# Patient Record
Sex: MALE | Race: WHITE | NOT HISPANIC OR LATINO | Employment: FULL TIME | URBAN - METROPOLITAN AREA
[De-identification: names, ages, dates, MRNs, and addresses within clinical notes are randomized per-mention and may not be internally consistent; named-entity substitution may affect disease eponyms.]

---

## 2019-06-05 ENCOUNTER — HOSPITAL ENCOUNTER (OUTPATIENT)
Dept: FAMILY MEDICINE CLINIC | Facility: CLINIC | Age: 34
Setting detail: SPECIMEN
Discharge: HOME | End: 2019-06-05
Attending: FAMILY MEDICINE | Admitting: FAMILY MEDICINE

## 2019-06-05 ENCOUNTER — CONVERSION ENCOUNTER (OUTPATIENT)
Dept: FAMILY MEDICINE CLINIC | Facility: CLINIC | Age: 34
End: 2019-06-05

## 2019-06-05 VITALS
OXYGEN SATURATION: 99 % | SYSTOLIC BLOOD PRESSURE: 133 MMHG | DIASTOLIC BLOOD PRESSURE: 88 MMHG | WEIGHT: 247 LBS | HEART RATE: 58 BPM | BODY MASS INDEX: 34.94 KG/M2

## 2019-06-05 LAB
ALBUMIN SERPL-MCNC: 4 G/DL (ref 3.5–4.8)
ALBUMIN/GLOB SERPL: 1.3 {RATIO} (ref 1–1.7)
ALP SERPL-CCNC: 62 IU/L (ref 32–91)
ALT SERPL-CCNC: 32 IU/L (ref 17–63)
ANION GAP SERPL CALC-SCNC: 12.7 MMOL/L (ref 10–20)
AST SERPL-CCNC: 24 IU/L (ref 15–41)
BILIRUB SERPL-MCNC: 0.6 MG/DL (ref 0.3–1.2)
BUN SERPL-MCNC: 11 MG/DL (ref 8–20)
BUN/CREAT SERPL: 11 (ref 6.2–20.3)
CALCIUM SERPL-MCNC: 8.8 MG/DL (ref 8.9–10.3)
CHLORIDE SERPL-SCNC: 105 MMOL/L (ref 101–111)
CHOLEST SERPL-MCNC: 228 MG/DL
CHOLEST/HDLC SERPL: 6.3 {RATIO}
CONV CO2: 24 MMOL/L (ref 22–32)
CONV LDL CHOLESTEROL DIRECT: 191 MG/DL (ref 0–100)
CONV TOTAL PROTEIN: 7.1 G/DL (ref 6.1–7.9)
CREAT UR-MCNC: 1 MG/DL (ref 0.7–1.2)
GLOBULIN UR ELPH-MCNC: 3.1 G/DL (ref 2.5–3.8)
GLUCOSE SERPL-MCNC: 98 MG/DL (ref 65–99)
HDLC SERPL-MCNC: 36 MG/DL
LDLC/HDLC SERPL: 5.3 {RATIO}
LIPID INTERPRETATION: ABNORMAL
POTASSIUM SERPL-SCNC: 3.7 MMOL/L (ref 3.6–5.1)
SODIUM SERPL-SCNC: 138 MMOL/L (ref 136–144)
TRIGL SERPL-MCNC: 104 MG/DL
VLDLC SERPL CALC-MCNC: 1.1 MG/DL

## 2019-06-06 NOTE — PROGRESS NOTES
Visit Type:  Annual Physical  Referring Provider:  Jamia Aiken MD  Primary Provider:  Jamia Aiken MD    CC:  CPE and fasting labs.    History of Present Illness:         The patient comes in today for a wellness visit.  Increaed anxiety related to new promotion at work.  The patient denies chest pain, palpitations, dizziness, syncope, low blood sugar symptoms, high blood sugar symptoms, edema, SOB, HUBER, PND and   orthopnea.  Since the last visit patient notes no new problems or concerns.  The patient admits to taking medications as prescribed and exercising occasionally.  When questioned about medication side effects, patient notes GI upset.        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 [place]                  Risk Factors:     Smoked Tobacco Use:  Former smoker     Cigarettes:  Yes -- 1 pack(s) per day,      Years smoked:  15        Year quit:  2016        Years Since Last Quit:  3   Caffeine use:  5+ drinks per day  Alcohol use:  no  Exercise:  no  Seatbelt use:  0 %  Sun Exposure:  occasionally    Previous Tobacco Use: Signed On - 2019  Smoked Tobacco Use:  Former smoker     Cigarettes:  Yes -- 1 pack(s) per day,      Years smoked:  15        Year quit:  2016        Years Since Last Quit:  3 years, 5 months, 4 days     Counseled to quit/cut down:  yes  Caffeine use:  5+ drinks per day    Previous Alcohol Use: Signed On -  2019  Alcohol use:  no  Exercise:  no  Seatbelt use:  0 %  Sun Exposure:  occasionally        Vital Signs:    Patient Profile:    34 Years Old Male  Height:     70.5 inches  Weight:     247 pounds  BMI:        34.94     O2 Sat:     99 %  Temp:       97.1 degrees F oral  Pulse rate: 58 / minute  BP Sittin / 88  (left arm)    Cuff size:  regular      Problems: Active problems were reviewed with the patient during this visit.  Medications: Medications were reviewed with the patient during this visit.  Allergies: Allergies were reviewed with the patient during this visit.        Vitals Entered By: Ankita Villareal CMA (2019 10:11 AM)      Physical Exam    General:      well developed, well nourished, in no acute distress.    Head:      normocephalic and atraumatic.    Eyes:      PERRL/EOM intact, conjunctiva and sclera clear with out nystagmus.    Ears:      TM's intact and clear with normal canals with grossly normal hearing.    Mouth:      no deformity or lesions with good dentition.    Neck:      no masses, thyromegaly, or abnormal cervical nodes.    Lungs:      clear bilaterally to auscultation.    Heart:      non-displaced PMI, chest non-tender; regular rate and rhythm, S1, S2 without murmurs, rubs, or gallops  Abdomen:       normal bowel sounds; no hepatosplenomegaly no ventral,umbilical hernias or masses noted.    Msk:      no deformity or scoliosis noted of thoracic or lumbar spine.    Extremities:      no pedal edema.    Neurologic:      CN II-XII gross intact.    Skin:      intact without lesions or rashes.    Cervical Nodes:      no significant adenopathy.    Psych:      alert and cooperative; normal mood and affect; normal attention span and concentration.        Blood Pressure:  Today's BP: 133/88 mm Hg    Labwork:   Most Recent Lab Results:   LDL: 119 mg/dL 2013    Active Medications (reviewed today):  ALLOPURINOL 300 MG ORAL TABLET (ALLOPURINOL) Take 1 tablet by mouth daily    Current  Allergies (reviewed today):  CODEINE (Critical)        Impression & Recommendations:    Problem # 1:  PREVENTIVE HEALTH CARE (ICD-V70.0) (ENC01-X73.00)    Reviewed preventive care protocols, scheduled due services, and updated immunizations.    Orders:  [facility] LIPID PANEL (LIPID)  [facility] COMPREHENSIVE METABOLIC PANEL (CMP) (MPC)  Est. Well Exam 18-39 yrs. (40027)          Patient Instructions:  1)  Discussed importance of regular exercise and recommended starting or continuing a regular exercise program for good health.  2)  The patient was encouraged to lose weight for better health.                      Medication Administration    Orders Added:  1)  [facility] LIPID PANEL [LIPID]  2)  [facility] COMPREHENSIVE METABOLIC PANEL (CMP) [MPC]  3)  Est. Well Exam 18-39 yrs. [90273]  ]      Electronically signed by Jamia Aiken MD on 06/05/2019 at 10:28 AM  ________________________________________________________________________       Disclaimer: Converted Note message may not contain all data elements that existed in the legacy source system. Please see [employer] for the original note details.

## 2019-06-17 ENCOUNTER — OFFICE VISIT (OUTPATIENT)
Dept: FAMILY MEDICINE CLINIC | Facility: CLINIC | Age: 34
End: 2019-06-17

## 2019-06-17 VITALS
HEART RATE: 74 BPM | TEMPERATURE: 98 F | OXYGEN SATURATION: 97 % | WEIGHT: 255 LBS | DIASTOLIC BLOOD PRESSURE: 74 MMHG | SYSTOLIC BLOOD PRESSURE: 124 MMHG | BODY MASS INDEX: 36.07 KG/M2

## 2019-06-17 DIAGNOSIS — M1A.09X0 CHRONIC GOUT OF MULTIPLE SITES, UNSPECIFIED CAUSE: Chronic | ICD-10-CM

## 2019-06-17 DIAGNOSIS — M67.432 GANGLION CYST OF WRIST, LEFT: Primary | ICD-10-CM

## 2019-06-17 PROBLEM — M10.9 GOUT: Chronic | Status: ACTIVE | Noted: 2019-06-17

## 2019-06-17 PROCEDURE — 99213 OFFICE O/P EST LOW 20 MIN: CPT | Performed by: FAMILY MEDICINE

## 2019-06-17 RX ORDER — HYDROCODONE BITARTRATE AND ACETAMINOPHEN 5; 325 MG/1; MG/1
1 TABLET ORAL EVERY 6 HOURS PRN
Qty: 28 TABLET | Refills: 0
Start: 2019-06-17 | End: 2019-07-31

## 2019-06-17 RX ORDER — ALLOPURINOL 300 MG/1
300 TABLET ORAL DAILY
Refills: 3 | COMMUNITY
Start: 2019-05-08 | End: 2020-05-11

## 2019-06-17 NOTE — PROGRESS NOTES
Subjective   Chief Complaint   Patient presents with   • Hand Pain     Lt hand feels a knot     Paul Vaughn is a 34 y.o. male.     Hand Pain    The incident occurred more than 1 week ago. Incident location: No prior incident.  No known injury.  There was no injury mechanism. The pain is present in the left hand. The quality of the pain is described as aching. The pain is at a severity of 9/10. The pain is severe. The pain has been intermittent since the incident. Pertinent negatives include no chest pain. The symptoms are aggravated by movement. He has tried NSAIDs and ice for the symptoms. The treatment provided mild relief.      Past Medical History:   Diagnosis Date   • Depression    • Gout    • Insomnia    • Pneumonia 2016     Past Surgical History:   Procedure Laterality Date   • VASECTOMY     • WISDOM TOOTH EXTRACTION       Allergies   Allergen Reactions   • Codeine Unknown (See Comments)     Pt told as a child     Social History     Socioeconomic History   • Marital status:      Spouse name: Not on file   • Number of children: Not on file   • Years of education: Not on file   • Highest education level: Not on file   Tobacco Use   • Smoking status: Former Smoker     Social History     Tobacco Use   Smoking Status Former Smoker       family history includes Alcohol abuse in his other; Allergies in his other; Arthritis in his other; Gout in his other; Mental illness in his mother.  Current Outpatient Medications on File Prior to Visit   Medication Sig Dispense Refill   • allopurinol (ZYLOPRIM) 300 MG tablet Take 300 mg by mouth Daily.  3     No current facility-administered medications on file prior to visit.      Patient Active Problem List   Diagnosis   • Gout       The following portions of the patient's history were reviewed and updated as appropriate: allergies, current medications, past family history, past medical history, past social history, past surgical history and problem  list.    Review of Systems   Constitutional: Negative for chills and fever.   HENT: Negative for sinus pressure and sore throat.    Eyes: Negative for blurred vision.   Respiratory: Negative for cough and shortness of breath.    Cardiovascular: Negative for chest pain and palpitations.   Gastrointestinal: Negative for abdominal pain.   Endocrine: Negative for polyuria.   Musculoskeletal: Positive for arthralgias and myalgias.   Skin: Negative for color change, dry skin and rash.   Neurological: Negative for dizziness and headache.   Hematological: Negative for adenopathy.   Psychiatric/Behavioral: Negative for depressed mood.       Objective   /74 (BP Location: Left arm, Patient Position: Sitting, Cuff Size: Adult)   Pulse 74   Temp 98 °F (36.7 °C) (Oral)   Wt 116 kg (255 lb)   SpO2 97%   BMI 36.07 kg/m²   Physical Exam   Constitutional: He appears well-nourished.   Cardiovascular: Normal rate, regular rhythm and normal heart sounds.   Pulmonary/Chest: Effort normal and breath sounds normal.   Musculoskeletal:        Left wrist: He exhibits decreased range of motion and tenderness. He exhibits no effusion, no crepitus, no deformity and no laceration.   Neurological: He is alert.   Skin: Skin is warm and dry. Capillary refill takes less than 2 seconds.         Assessment/Plan   Problems Addressed this Visit        Other    Gout (Chronic)     Continue current medicines.            Other Visit Diagnoses     Ganglion cyst of wrist, left    -  Primary    Rest, ice, elevate left wrist/hand.  Refer to hand surgeon for further assessment.  Ganglion cyst is likely but need to rule out gouty arthropathy.    Relevant Medications    HYDROcodone-acetaminophen (NORCO) 5-325 MG per tablet    Other Relevant Orders    Ambulatory Referral to Hand Surgery

## 2019-07-31 ENCOUNTER — OFFICE VISIT (OUTPATIENT)
Dept: FAMILY MEDICINE CLINIC | Facility: CLINIC | Age: 34
End: 2019-07-31

## 2019-07-31 VITALS
DIASTOLIC BLOOD PRESSURE: 89 MMHG | OXYGEN SATURATION: 99 % | HEART RATE: 69 BPM | TEMPERATURE: 97.2 F | SYSTOLIC BLOOD PRESSURE: 142 MMHG

## 2019-07-31 DIAGNOSIS — M79.672 LEFT FOOT PAIN: Primary | ICD-10-CM

## 2019-07-31 DIAGNOSIS — M1A.09X0 CHRONIC GOUT OF MULTIPLE SITES, UNSPECIFIED CAUSE: Chronic | ICD-10-CM

## 2019-07-31 PROCEDURE — 99213 OFFICE O/P EST LOW 20 MIN: CPT | Performed by: FAMILY MEDICINE

## 2019-07-31 RX ORDER — NAPROXEN 500 MG/1
500 TABLET ORAL 2 TIMES DAILY WITH MEALS
Qty: 60 TABLET | Refills: 2 | Status: SHIPPED | OUTPATIENT
Start: 2019-07-31 | End: 2020-05-20

## 2019-07-31 RX ORDER — HYDROCODONE BITARTRATE AND ACETAMINOPHEN 5; 325 MG/1; MG/1
1 TABLET ORAL EVERY 8 HOURS PRN
Qty: 20 TABLET | Refills: 0 | Status: SHIPPED | OUTPATIENT
Start: 2019-07-31 | End: 2019-08-20

## 2019-07-31 RX ORDER — NAPROXEN 375 MG/1
375 TABLET ORAL
Refills: 0 | COMMUNITY
Start: 2019-07-04 | End: 2019-07-31

## 2019-07-31 NOTE — PROGRESS NOTES
Subjective   Chief Complaint   Patient presents with   • Foot Pain     it popped 7/27 morning and has been hurting ever since. It starting swollen yesterday     Paul Vaughn is a 34 y.o. male.     He has been working a lot more hours for work. He stepped out of bed on the floor and just started having pain.  H/O gout but this feels different.  H/O plantar fasciitis on the right side but this feels different. No known injury. He is walking with the help of a walker and is not bearing weight on his left foot.       Foot Pain   This is a new problem. The current episode started in the past 7 days. The problem occurs constantly. The problem has been gradually worsening. Associated symptoms include arthralgias and joint swelling. Pertinent negatives include no chills, coughing, fatigue, fever, headaches or rash. The symptoms are aggravated by standing and walking. He has tried NSAIDs and oral narcotics for the symptoms. The treatment provided moderate relief.      Past Medical History:   Diagnosis Date   • Depression    • Gout    • Insomnia    • Pneumonia 2016     Past Surgical History:   Procedure Laterality Date   • VASECTOMY     • WISDOM TOOTH EXTRACTION       Allergies   Allergen Reactions   • Codeine Unknown (See Comments)     Pt told as a child     Social History     Socioeconomic History   • Marital status:      Spouse name: Not on file   • Number of children: Not on file   • Years of education: Not on file   • Highest education level: Not on file   Tobacco Use   • Smoking status: Former Smoker     Social History     Tobacco Use   Smoking Status Former Smoker       family history includes Alcohol abuse in his other; Allergies in his other; Arthritis in his other; Gout in his other; Mental illness in his mother.  Current Outpatient Medications on File Prior to Visit   Medication Sig Dispense Refill   • [DISCONTINUED] naproxen (NAPROSYN) 375 MG tablet Take 375 mg by mouth 2 (Two) Times a Day Before  Meals.  0   • allopurinol (ZYLOPRIM) 300 MG tablet Take 300 mg by mouth Daily.  3   • [DISCONTINUED] HYDROcodone-acetaminophen (NORCO) 5-325 MG per tablet Take 1 tablet by mouth Every 6 (Six) Hours As Needed for Moderate Pain . 28 tablet 0     No current facility-administered medications on file prior to visit.      Patient Active Problem List   Diagnosis   • Gout   • Left foot pain   • Hypertriglyceridemia       The following portions of the patient's history were reviewed and updated as appropriate: allergies, current medications, past family history, past medical history, past social history, past surgical history and problem list.    Review of Systems   Constitutional: Negative for chills, fatigue and fever.   Respiratory: Negative for cough.    Musculoskeletal: Positive for arthralgias and joint swelling.   Skin: Negative for rash.       Objective   /89 (BP Location: Left arm, Patient Position: Sitting, Cuff Size: Adult)   Pulse 69   Temp 97.2 °F (36.2 °C) (Oral)   SpO2 99%   Physical Exam   Constitutional: He appears well-developed and well-nourished.   Cardiovascular: Normal rate and regular rhythm.   Pulmonary/Chest: Effort normal and breath sounds normal.   Musculoskeletal:   Left dorsal foot with swelling and pain on palpation but no bruising or redness.     Neurological: He is alert.   Skin: Skin is warm.       No visits with results within 1 Week(s) from this visit.   Latest known visit with results is:   Hospital Outpatient Visit on 06/05/2019   Component Date Value Ref Range Status   • Total Cholesterol 06/05/2019 228* <200 mg/dL Final   • Triglycerides 06/05/2019 104  <150 mg/dL Final   • HDL Cholesterol 06/05/2019 36* >39 mg/dL Final   • LDL Cholesterol  06/05/2019 191* 0 - 100 mg/dL Final   • VLDL Cholesterol 06/05/2019 1.1  <21 mg/dL Final   • LDL/HDL Ratio 06/05/2019 5.3   Final   • Chol/HDL Ratio 06/05/2019 6.3   Final   • Lipid Interpretation 06/05/2019 (SEE BELOW)   Final    Comment:    The following guidelines have been recommended by the NCEP for Total  Cholesterol, Total Triglycerides, LDL Cholesterol,and HDL Cholesterol:    TOTAL CHOLESTEROL                    HDL CHOLESTEROL   Desirable:            <200 mg/dL     Low HDL:            <40 mg/dL   Borderline High:   200-239 mg/dL     Normal:           40-60 mg/dL   High:           > or = 240 mg/dL     Desirable:          >60 mg/dL    TOTAL TRIGLYCERIDE                   LDL CHOLESTEROL   Normal:               <150 mg/dL     Optimal:           <100 mg/dL   Borderline High:   150-199 mg/dL     Low Risk:       100-129 mg/dL   High:              200-499 mg/dL     Borderline High:130-159 mg/dL   Very High:      > or = 500 mg/dL     High:           160-189 mg/dL                                        Very High:   > or = 190 mg/dL    The following ratios of LDL to HDL and Total Cholesterol to HDL are  for information only:    LDL/HDL RATIO                        TOTAL CHOLESTEROL/HDL RATIO   Desirable:              <5                                      Low Risk:       3.3-4.4    Optimal:        < or = 3.5           Average Risk:   4.4-7.1                                         Medium Risk:    7.1-11                                          High Risk:         >11         • Sodium 06/05/2019 138  136 - 144 mmol/L Final   • Potassium 06/05/2019 3.7  3.6 - 5.1 mmol/L Final   • Chloride 06/05/2019 105  101 - 111 mmol/L Final   • CO2 06/05/2019 24  22 - 32 mmol/L Final   • Glucose 06/05/2019 98  65 - 99 mg/dL Final   • BUN 06/05/2019 11  8 - 20 mg/dL Final   • Creatinine 06/05/2019 1.0  0.7 - 1.2 mg/dl Final   • Calcium 06/05/2019 8.8* 8.9 - 10.3 mg/dL Final   • Total Protein 06/05/2019 7.1  6.1 - 7.9 g/dL Final   • Albumin 06/05/2019 4.0  3.5 - 4.8 g/dL Final   • Total Bilirubin 06/05/2019 0.6  0.3 - 1.2 mg/dL Final   • Alkaline Phosphatase 06/05/2019 62  32 - 91 IU/L Final   • AST (SGOT) 06/05/2019 24  15 - 41 IU/L Final   • ALT (SGPT) 06/05/2019 32   17 - 63 IU/L Final   • Anion Gap 06/05/2019 12.7  10 - 20 Final   • BUN/Creatinine Ratio 06/05/2019 11.0  6.2 - 20.3 Final   • GFR MDRD Non  06/05/2019 >60  >60 mL/min/1.73m2 Final   • GFR MDRD  06/05/2019 >60  >60 mL/min/1.73m2 Final   • Globulin 06/05/2019 3.1  2.5 - 3.8 G/dL Final   • A/G Ratio 06/05/2019 1.3  1.0 - 1.7 Final           Assessment/Plan   Problems Addressed this Visit        Nervous and Auditory    Left foot pain - Primary     Rest, ice, elevate foot.  Refer to podiatry.         Relevant Orders    Ambulatory Referral to Podiatry       Other    Gout (Chronic)    Relevant Orders    Ambulatory Referral to Podiatry          Paul was seen today for foot pain.    Diagnoses and all orders for this visit:    Left foot pain  -     Ambulatory Referral to Podiatry    Chronic gout of multiple sites, unspecified cause  -     Ambulatory Referral to Podiatry    Other orders  -     naproxen (NAPROSYN) 500 MG tablet; Take 1 tablet by mouth 2 (Two) Times a Day With Meals.  -     HYDROcodone-acetaminophen (NORCO) 5-325 MG per tablet; Take 1 tablet by mouth Every 8 (Eight) Hours As Needed for Moderate Pain .

## 2019-07-31 NOTE — PATIENT INSTRUCTIONS
Cryotherapy  What is cryotherapy?  Cryotherapy, or cold therapy, is a treatment that uses cold temperatures to treat an injury or medical condition. It includes using cold packs or ice packs to reduce pain and swelling. Only use cryotherapy if your doctor says it is okay.  How do I use cryotherapy?    · Place a towel between the cold source and your skin.  · Apply the cold source for no more than 20 minutes at a time.  · Check your skin after 5 minutes to make sure there are no signs of a poor response to cold or skin damage. Check for:  · White spots on your skin. Your skin may look blotchy or mottled.  · Skin that looks blue or pale.  · Skin that feels waxy or hard.  · Repeat these steps as many times each day as told by your doctor.  How can I make a cold pack?  When using a cold pack at home to reduce pain and swelling, you can use:  · A silica gel cold pack that has been left in the freezer. You can buy this online or in stores.  · A plastic bag of frozen vegetables.  · A sealable plastic bag that has been filled with crushed ice.  Always wrap the pack in a dry or damp towel to avoid direct contact with your skin.  Contact a doctor if:  · You start to have white spots on your skin. This may give your skin a blotchy or mottled look.  ? Your skin turns blue or pale.  ? Your skin becomes waxy or hard.  ? Your swelling gets worse.  This information is not intended to replace advice given to you by your health care provider. Make sure you discuss any questions you have with your health care provider.  Document Released: 06/05/2009 Document Revised: 02/22/2018 Document Reviewed: 08/31/2016  [employer] Interactive Patient Education © 2019 [employer]

## 2019-08-20 ENCOUNTER — OFFICE VISIT (OUTPATIENT)
Dept: PODIATRY | Facility: CLINIC | Age: 34
End: 2019-08-20

## 2019-08-20 VITALS
HEIGHT: 71 IN | SYSTOLIC BLOOD PRESSURE: 126 MMHG | HEART RATE: 60 BPM | WEIGHT: 246 LBS | BODY MASS INDEX: 34.44 KG/M2 | DIASTOLIC BLOOD PRESSURE: 85 MMHG

## 2019-08-20 DIAGNOSIS — M72.2 PLANTAR FASCIITIS, BILATERAL: Primary | ICD-10-CM

## 2019-08-20 PROCEDURE — 99203 OFFICE O/P NEW LOW 30 MIN: CPT | Performed by: PODIATRIST

## 2019-08-20 RX ORDER — AMOXICILLIN 500 MG/1
CAPSULE ORAL
COMMUNITY
Start: 2019-08-19 | End: 2020-05-20

## 2019-08-20 NOTE — PROGRESS NOTES
08/20/2019  Foot and Ankle Surgery - New Patient   Provider: Dr. Parker Anderson DPM  Location: [facility]    Subjective:  Paul Vaughn is a 34 y.o. male.     Chief Complaint   Patient presents with   • Left Foot - Pain   • Right Foot - Pain       HPI: Patient is a 34-year-old male that presents with bilateral foot pain.  Symptoms have been present for several months.  He feels that symptoms are gradually progressive with increased activity and improved with rest.  He predominantly notices discomfort to the forefoot and heel region.  Symptoms are present with first few steps in the morning.  He rates the pain a 7 out of 10 when noticed.  He denies any injury to the feet.    Allergies   Allergen Reactions   • Codeine Unknown (See Comments)     Pt told as a child       Past Medical History:   Diagnosis Date   • Depression    • Gout    • Insomnia    • Pneumonia 2016       Past Surgical History:   Procedure Laterality Date   • VASECTOMY     • WISDOM TOOTH EXTRACTION         Family History   Problem Relation Age of Onset   • Mental illness Mother         Psychiatric care.   • Allergies Other    • Gout Other    • Alcohol abuse Other    • Arthritis Other        Social History     Socioeconomic History   • Marital status:      Spouse name: Not on file   • Number of children: Not on file   • Years of education: Not on file   • Highest education level: Not on file   Tobacco Use   • Smoking status: Former Smoker     Last attempt to quit: 2016     Years since quitting: 3.6   • Smokeless tobacco: Never Used   Substance and Sexual Activity   • Alcohol use: No     Frequency: Never   • Drug use: Defer   • Sexual activity: Defer        Current Outpatient Medications on File Prior to Visit   Medication Sig Dispense Refill   • allopurinol (ZYLOPRIM) 300 MG tablet Take 300 mg by mouth Daily.  3   • amoxicillin (AMOXIL) 500 MG capsule      • naproxen (NAPROSYN) 500 MG tablet Take 1 tablet by mouth 2 (Two) Times a  "Day With Meals. 60 tablet 2   • [DISCONTINUED] HYDROcodone-acetaminophen (NORCO) 5-325 MG per tablet Take 1 tablet by mouth Every 8 (Eight) Hours As Needed for Moderate Pain . 20 tablet 0     No current facility-administered medications on file prior to visit.        Review of Systems:  General: Denies fever, chills, fatigue, and weakness.  Eyes: Denies vision loss, blurry vision, and excessive redness.  ENT: Denies hearing issues and difficulty swallowing.  Cardiovascular: Denies palpitations, chest pain, or syncopal episodes.  Respiratory: Denies shortness of breath, wheezing, and coughing.  GI: Denies abdominal pain, nausea, and vomiting.   : Denies frequency, hematuria, and urgency.  Musculoskeletal: + Bilateral foot pain  Derm: Denies rash, open wounds, or suspicious lesions.  Neuro: Denies headaches, numbness, loss of coordination, and tremors.  Psych: Denies anxiety and depression.  Endocrine: Denies temperature intolerance and changes in appetite.  Heme: Denies bleeding disorders or abnormal bruising.     Objective   /85   Pulse 60   Ht 179.1 cm (70.5\")   Wt 112 kg (246 lb)   BMI 34.80 kg/m²     General Exam  Appearance: appears stated age and healthy   Orientation: alert and oriented to person, place, and time   Affect: appropriate   Gait: antalgic     Foot/Ankle Exam  Inspection and Palpation  Ecchymosis - Right: none Left: none  Tenderness - Right: calcaneus tenderness   Left: calcaneus tenderness   Swelling - Right: none   Left: none    Arch - Right: normal Left: normal  Hammertoes - Right: absent Left: absent  Claw Toes - Right: absent Left: absent  Mallet Toes - Right: absent Left: absent  Hallux Valgus - Right: no Left: no  Hallux Limitus - Right: no Left: no  Skin - Right: skin intact  Left: skin intact     Vascular  Dorsalis Pedis - Right: 3+ Left: 3+  Posterior Tibial - Right: 3+ Left: 3+  Skin Temperature -  Right: warm  Left: warm    CFT - Right: < 3 seconds Left: < 3 " seconds    Neurologic  Saphenous Nerve Sensation  - Right: normal Left: normal  Tibial Nerve Sensation - Right: normal Left: normal  Superficial Peroneal Nerve Sensation - Right: normal Left: normal  Deep Peroneal Nerve Sensation - Right: normal Left: normal  Sural Nerve Sensation - Right: normal Left: normal  Achilles Reflex - Right: 2+ Left: 2+    Muscle Strength  Dorsiflexion - Right: 5 Left: 5  Plantar Flexion - Right: 5 Left: 5  Eversion - Right: 5 Left: 5  Inversion - Right: 5 Left: 5  Great Toe Extension - Right: 5 Left: 5  Great Toe Flexion - Right: 5 Left: 5    Range of Motion  Normal right ankle ROM  Normal left ankle ROM    Tests  Calcaneal Squeeze - Right: positive  Left: positive     Right Foot/Ankle Comments  Equinus contracture with knee extended and flexed, bilateral. no gross deformity or instability, bilateral          Assessment/Plan   Paul was seen today for pain and pain.    Diagnoses and all orders for this visit:    Plantar fasciitis, bilateral  -     XR Foot 3+ View Bilateral  -     Ambulatory Referral to Physical Therapy Evaluate and treat; Stretching, ROM, Strengthening      Patient presents with long-standing issues involving the plantar aspect of both feet.  He localizes discomfort to the arch and heel regions today.  He complains of pain with weightbearing activity that improves with rest.  Imaging was reviewed showing no fractures or dislocations.  He does have spur formation involving the posterior aspect of the calcaneus, bilaterally.  On exam, he does have pain to the plantar heel calcaneal tuberosity.  He also has significant soft tissue rigidity and equinus contracture.  I do feel that his symptoms are secondary to tissue tightness and plantar fasciitis.  We reviewed the diagnoses and treatment options at length.  I have asked that he increase his support with over-the-counter devices.  We also reviewed proper shoes.  I have spends stretching exercises that he is to perform on  a daily basis.  We did review rice therapy, proper activity, and anti-inflammatory use.  I would like to see him in 4 to 6 weeks for reevaluation.  If he continues to have discomfort, may need to consider proceeding with steroid injections.    Orders Placed This Encounter   Procedures   • XR Foot 3+ View Bilateral     Weight Bearing     Order Specific Question:   Reason for Exam:     Answer:   Bilateral foot pain with swelling no pin point of pain RM 14 WB NPS   • Ambulatory Referral to Physical Therapy Evaluate and treat; Stretching, ROM, Strengthening     Referral Priority:   Routine     Referral Type:   Therapy     Referral Reason:   Specialty Services Required     Requested Specialty:   Physical Therapy     Number of Visits Requested:   1        Note is dictated utilizing voice recognition software. Unfortunately this leads to occasional typographical errors. I apologize in advance if the situation occurs. If questions occur please do not hesitate to call our office.

## 2019-08-27 ENCOUNTER — OFFICE VISIT (OUTPATIENT)
Dept: PHYSICAL THERAPY | Facility: CLINIC | Age: 34
End: 2019-08-27

## 2019-08-27 DIAGNOSIS — R26.2 DIFFICULTY IN WALKING: ICD-10-CM

## 2019-08-27 DIAGNOSIS — M72.2 BILATERAL PLANTAR FASCIITIS: Primary | ICD-10-CM

## 2019-08-27 PROCEDURE — 97110 THERAPEUTIC EXERCISES: CPT | Performed by: PHYSICAL THERAPIST

## 2019-08-27 PROCEDURE — 97161 PT EVAL LOW COMPLEX 20 MIN: CPT | Performed by: PHYSICAL THERAPIST

## 2019-08-27 NOTE — PROGRESS NOTES
Physical Therapy Initial Evaluation and Plan of Care    Patient: Paul Vaughn   : 1985  Diagnosis/ICD-10 Code:  Bilateral plantar fasciitis [M72.2]  Referring practitioner: RASHARD Anderson DPM  Date of Initial Visit: 2019  Today's Date: 2019  Patient seen for 1 sessions           Subjective Questionnaire: LEFS: 65      Subjective Evaluation    History of Present Illness  Mechanism of injury: Patient reports that in 2019 he started having pain in the left foot when he was diagnosed with plantar fasciitis. He started having right foot pain in 2019. He has noticed that his shoes can have an affect on his feet. He has a history of gout, however, he can feel the difference between gout and foot pain. It has gotten bad enough to where he has had to use a walker. Walking and standing for long periods of time, working out, sleeping throughout the night, and wearing bad shoes tends to aggravate symptoms.    Quality of life: good    Pain  Current pain ratin  At best pain ratin  At worst pain ratin  Quality: burning and throbbing  Relieving factors: ice, rest and medications  Aggravating factors: ambulation, stairs, standing and sleeping  Progression: no change    Social Support  Lives in: one-story house  Lives with: spouse and young children    Patient Goals  Patient goals for therapy: decreased pain, improved balance, increased motion, increased strength, independence with ADLs/IADLs and return to sport/leisure activities  Patient goal: To have less pain in the feet           Objective       Palpation   Left   Hypertonic in the lateral gastrocnemius, medial gastrocnemius and plantaris.   Tenderness of the lateral gastrocnemius, medial gastrocnemius and plantaris.     Right   Hypertonic in the lateral gastrocnemius, medial gastrocnemius and plantaris. Tenderness of the lateral gastrocnemius, medial gastrocnemius and plantaris.     Tenderness   Left Ankle/Foot    Tenderness in the Achilles insertion.     Right Ankle/Foot   Tenderness in the Achilles insertion.     Active Range of Motion   Left Ankle/Foot   Dorsiflexion (ke): 0 degrees   Plantar flexion: WFL    Right Ankle/Foot   Dorsiflexion (ke): 0 degrees   Plantar flexion: WFL    Passive Range of Motion   Left Ankle/Foot    Dorsiflexion (ke): 5 degrees     Right Ankle/Foot    Dorsiflexion (ke): 5 degrees     Joint Play   Left Ankle/Foot  Hypomobile in the talocrural joint and subtalar joint.     Right Ankle/Foot  Hypomobile in the talocrural joint and subtalar joint.     Strength/Myotome Testing     Left Hip   Planes of Motion   Flexion: 4+    Right Hip   Planes of Motion   Flexion: 4+    Left Knee   Flexion: 4+  Extension: 4+    Right Knee   Flexion: 4+  Extension: 4+    Left Ankle/Foot   Dorsiflexion: 5  Inversion: 5  Eversion: 5  Great toe extension: 5    Right Ankle/Foot   Dorsiflexion: 5  Inversion: 5  Eversion: 5  Great toe extension: 5    Tests   Left Ankle/Foot   Positive for windlass.     Right Ankle/Foot   Positive for windlass.          Assessment & Plan     Assessment  Impairments: abnormal coordination, abnormal gait, abnormal muscle firing, abnormal muscle tone, abnormal or restricted ROM, activity intolerance, impaired physical strength, lacks appropriate home exercise program and pain with function  Assessment details: The patient is a 34 y.o. male who presents to physical therapy today for bilateral plantar pain. Upon initial evaluation, the patient demonstrates the following impairments: increased pain, increased muscle tone, an antalgic gait pattern, decreased strength, decreased joint mobility, and decreased ROM. Due to these impairments, the patient is unable to stand or walk for long periods of time, sleep throughout the night, and go barefoot without an increase in symptoms. The patient would benefit from skilled PT services to address functional limitations and impairments and to improve patient  quality of life.      Prognosis: good  Functional Limitations: walking, uncomfortable because of pain and standing  Goals  Plan Goals: STG's: 2 weeks   1. Patient will report pain level at worse </= 5/10 for improved tolerance to ADLs and functional mobility  2. Patient will require <3 tactile or verbal cues for proper mechanics during completion of his HEP     LTG's: By Discharge  1. Patient will report pain levels at worst </= 3/10 for improved tolerance to ADLs and functional mobility  2. Patient will be able to ambulate unlimited distances without an assistive device and no increased pain  3. Patient will report improved levels of overall function as demonstrated by an increase in his reported level of function score on the LEFS    4. Patient will report improvement in their tolerance to sleeping and report waking up </= 1x/night per positional discomfort  5. Patient will require no tactile or verbal cues for proper mechanics during completion of his HEP for final independence         Plan  Therapy options: will be seen for skilled physical therapy services  Planned modality interventions: cryotherapy, electrical stimulation/Russian stimulation, TENS, thermotherapy (hydrocollator packs) and ultrasound  Other planned modality interventions: dry needling  Planned therapy interventions: balance/weight-bearing training, fine motor coordination training, flexibility, functional ROM exercises, gait training, home exercise program, joint mobilization, manual therapy, neuromuscular re-education, soft tissue mobilization, spinal/joint mobilization, strengthening, stretching and therapeutic activities  Duration in visits: 12  Treatment plan discussed with: patient        History # of Personal Factors and/or Comorbidities: LOW (0)  Examination of Body System(s): # of elements: LOW (1-2)  Clinical Presentation: STABLE   Clinical Decision Making: LOW       Timed:         Manual Therapy:    8     mins  02989;     Therapeutic  Exercise:    10     mins  23298;     Neuromuscular Ludin:        mins  77297;    Therapeutic Activity:          mins  60351;     Gait Training:           mins  45492;     Ultrasound:          mins  99322;    Ionto                                   mins   28146  Self Care                            mins   97659  Canalith Repos         mins 41833      Un-Timed:  Electrical Stimulation:         mins  75230 ( );  Dry Needling          mins self-pay  Traction          mins 72194  Low Eval     25     Mins  40775  Mod Eval          Mins  76089  High Eval                            Mins  00452  Re-Eval                               mins  85252        Timed Treatment:   18   mins   Total Treatment:     50   mins    PT SIGNATURE: Danielle Powell PT   DATE TREATMENT INITIATED: 8/27/2019    Initial Certification  Certification Period: 11/25/2019  I certify that the therapy services are furnished while this patient is under my care.  The services outlined above are required by this patient, and will be reviewed every 90 days.     PHYSICIAN: RASHARD Anderson DPIAN      DATE:     Please sign and return via fax to [phone].. Thank you, [facility].

## 2019-09-03 ENCOUNTER — OFFICE VISIT (OUTPATIENT)
Dept: PHYSICAL THERAPY | Facility: CLINIC | Age: 34
End: 2019-09-03

## 2019-09-03 DIAGNOSIS — R26.2 DIFFICULTY IN WALKING: ICD-10-CM

## 2019-09-03 DIAGNOSIS — M72.2 BILATERAL PLANTAR FASCIITIS: Primary | ICD-10-CM

## 2019-09-03 PROCEDURE — 97110 THERAPEUTIC EXERCISES: CPT | Performed by: PHYSICAL THERAPIST

## 2019-09-03 PROCEDURE — 97112 NEUROMUSCULAR REEDUCATION: CPT | Performed by: PHYSICAL THERAPIST

## 2019-09-03 PROCEDURE — 97140 MANUAL THERAPY 1/> REGIONS: CPT | Performed by: PHYSICAL THERAPIST

## 2019-09-03 NOTE — PROGRESS NOTES
Physical Therapy Daily Progress Note    VISIT#: 2    Subjective   Paul Vaughn reports that he did a lot of walking over the weekend and his shoes aren't the greatest so he is feeling it today.    Pain Rating (0-10): 4    Objective     See Exercise, Manual, and Modality Logs for complete treatment.     Patient Education: More supportive shoes    Assessment & Plan     Assessment  Assessment details: Patient had moderate tightness throughout plantar fascia but tolerated treatment well.  He had mild pain with manual therapy.   He tolerated exercise progression well.           Progress per Plan of Care and Progress strengthening /stabilization /functional activity            Timed:         Manual Therapy:    15     mins  66687;     Therapeutic Exercise:    15     mins  52635;     Neuromuscular Ludin:    15    mins  01578;    Therapeutic Activity:          mins  01310;     Gait Training:           mins  26462;     Ultrasound:          mins  42952;    Ionto                                   mins   97403  Self Care                            mins   93242  Canalith Repos                   mins  74237    Un-Timed:  Electrical Stimulation:         mins  46578 ( );  Dry Needling          mins self-pay  Traction          mins 65879  Low Eval          Mins  70997  Mod Eval          Mins  77935  High Eval                            Mins  00186  Re-Eval                               mins  63768    Timed Treatment:   45   mins   Total Treatment:     45   mins    Sheeba Hamilton PT  [place] License # [id number]  Physical Therapist

## 2019-09-10 ENCOUNTER — OFFICE VISIT (OUTPATIENT)
Dept: PHYSICAL THERAPY | Facility: CLINIC | Age: 34
End: 2019-09-10

## 2019-09-10 DIAGNOSIS — M72.2 BILATERAL PLANTAR FASCIITIS: Primary | ICD-10-CM

## 2019-09-10 DIAGNOSIS — R26.2 DIFFICULTY IN WALKING: ICD-10-CM

## 2019-09-10 PROCEDURE — 97110 THERAPEUTIC EXERCISES: CPT | Performed by: PHYSICAL THERAPIST

## 2019-09-10 PROCEDURE — 97530 THERAPEUTIC ACTIVITIES: CPT | Performed by: PHYSICAL THERAPIST

## 2019-09-10 PROCEDURE — 97140 MANUAL THERAPY 1/> REGIONS: CPT | Performed by: PHYSICAL THERAPIST

## 2019-09-10 NOTE — PROGRESS NOTES
Physical Therapy Daily Progress Note    VISIT#: 3    Subjective   Paul Vaughn reports that he has had a little more soreness in the left foot today compared to the right foot.  Pain Ratin    Objective     See Exercise, Manual, and Modality Logs for complete treatment.     Patient Education: Pacers and Racers, proper shoe wear    Assessment & Plan     Assessment  Assessment details: The patient demonstrated tenderness and hypertonicity along the L plantar fascia which responded well to myofascial release. Patient tolerated added exercises well today with no increase in symptoms.        Progress per Plan of Care and Progress strengthening /stabilization /functional activity          Timed:         Manual Therapy:    18     mins  78335;     Therapeutic Exercise:    15     mins  70177;     Neuromuscular Ludin:        mins  77016;    Therapeutic Activity:     10     mins  21298;     Gait Training:           mins  47254;     Ultrasound:          mins  63854;    Ionto                                   mins   22781  Self Care                            mins   21863  Canalith Repos                   mins  42258    Un-Timed:  Electrical Stimulation:         mins  13429 ( );  Dry Needling          mins self-pay  Traction          mins 90326  Low Eval          Mins  18248  Mod Eval          Mins  28791  High Eval                            Mins  48015  Re-Eval                               mins  00854    Timed Treatment:   43   mins   Total Treatment:     46   mins    Danielle Powell PT  Physical Therapist  [place] License # [id number]

## 2019-09-17 ENCOUNTER — TREATMENT (OUTPATIENT)
Dept: PHYSICAL THERAPY | Facility: CLINIC | Age: 34
End: 2019-09-17

## 2019-09-17 DIAGNOSIS — M72.2 BILATERAL PLANTAR FASCIITIS: Primary | ICD-10-CM

## 2019-09-17 DIAGNOSIS — R26.2 DIFFICULTY IN WALKING: ICD-10-CM

## 2019-09-17 PROCEDURE — 97140 MANUAL THERAPY 1/> REGIONS: CPT | Performed by: PHYSICAL THERAPIST

## 2019-09-17 PROCEDURE — 97110 THERAPEUTIC EXERCISES: CPT | Performed by: PHYSICAL THERAPIST

## 2019-09-17 NOTE — PROGRESS NOTES
Physical Therapy Daily Progress Note      Patient: Paul Vaughn   : 1985  Diagnosis/ICD-10 Code:  Bilateral plantar fasciitis [M72.2]  Referring practitioner: RASHARD Anderson DPM  Date of Initial Visit: Type: THERAPY  Noted: 2019  Today's Date: 2019  Patient seen for 4 sessions             Subjective Pt says that he [place] and the main body part that was sore the next day was his shoulders, not his feet.    Objective   See Exercise, Manual, and Modality Logs for complete treatment.       Assessment/Plan  Pt had good tolerance with manual techniques and exercises today.  Add BAPS next visit.    Progress per Plan of Care           Timed:         Manual Therapy:    15     mins  86820;     Therapeutic Exercise:    10    mins  58209;     Neuromuscular Ludin:        mins  84694;    Therapeutic Activity:          mins  46047;     Gait Training:           mins  11891;     Ultrasound:          mins  20180;    Ionto                                   mins   18067  Self Care                            mins   27401      Un-Timed:  Electrical Stimulation:         mins  63617 ( );  Dry Needling          mins self-pay  Traction          mins 75391      Timed Treatment:   25   mins   Total Treatment:     60   mins    Nithin Garcia PTA  Physical Therapist

## 2019-09-23 ENCOUNTER — TELEPHONE (OUTPATIENT)
Dept: FAMILY MEDICINE CLINIC | Facility: CLINIC | Age: 34
End: 2019-09-23

## 2019-09-23 NOTE — TELEPHONE ENCOUNTER
Pt got a Friday the 13th tattoo on Friday the 13th and no thinks it is infected because it is red around the tattoo. Does he need a antibx or just some neosporin?

## 2019-09-24 ENCOUNTER — TREATMENT (OUTPATIENT)
Dept: PHYSICAL THERAPY | Facility: CLINIC | Age: 34
End: 2019-09-24

## 2019-09-24 DIAGNOSIS — R26.2 DIFFICULTY IN WALKING: ICD-10-CM

## 2019-09-24 DIAGNOSIS — M72.2 BILATERAL PLANTAR FASCIITIS: Primary | ICD-10-CM

## 2019-09-24 PROCEDURE — 97110 THERAPEUTIC EXERCISES: CPT | Performed by: PHYSICAL THERAPIST

## 2019-09-24 PROCEDURE — 97140 MANUAL THERAPY 1/> REGIONS: CPT | Performed by: PHYSICAL THERAPIST

## 2019-09-24 NOTE — PROGRESS NOTES
Physical Therapy Daily Progress Note      Patient: Paul Vaughn   : 1985  Diagnosis/ICD-10 Code:  Bilateral plantar fasciitis [M72.2]  Referring practitioner: RASHARD Anderson DPM  Date of Initial Visit: Type: THERAPY  Noted: 2019  Today's Date: 2019  Patient seen for 5 sessions             Subjective Pt says that his feet are surprisingly feeling better today.      Objective   See Exercise, Manual, and Modality Logs for complete treatment.       Assessment/Plan  Pt had good tolerance with today's progression and addition of exercises.  He had mild TTP in the middle of his arch but overall tolerated it well.    Progress per Plan of Care           Timed:         Manual Therapy:    10     mins  95940;     Therapeutic Exercise:    43     mins  54183;     Neuromuscular Ludin:        mins  38298;    Therapeutic Activity:          mins  74776;     Gait Training:           mins  62530;     Ultrasound:          mins  55831;    Ionto                                   mins   02198  Self Care                            mins   43681      Un-Timed:  Electrical Stimulation:         mins  77431 ( );  Dry Needling          mins self-pay  Traction          mins 84879      Timed Treatment:   53   mins   Total Treatment:     53   mins    Nithin Garcia PTA  Physical Therapist

## 2019-11-06 ENCOUNTER — DOCUMENTATION (OUTPATIENT)
Dept: PHYSICAL THERAPY | Facility: CLINIC | Age: 34
End: 2019-11-06

## 2019-11-06 NOTE — PROGRESS NOTES
Discharge Summary  Discharge Summary from Physical Therapy Report      Number of Visits: 5     Discharge Status of Patient: Discharged    Goals: Partially Met    Discharge Plan: Continue with current home exercise program as instructed    Comments: The patient is being discharged today per patient request. He is doing much better and wishes to be discharged at this time. Patient was given an HEP for continuing self care.    Date of Discharge 11/6/19        Danielle Powell, PT  Physical Therapist

## 2020-05-11 RX ORDER — ALLOPURINOL 300 MG/1
TABLET ORAL
Qty: 90 TABLET | Refills: 3 | Status: SHIPPED | OUTPATIENT
Start: 2020-05-11 | End: 2021-05-24

## 2020-05-20 ENCOUNTER — OFFICE VISIT (OUTPATIENT)
Dept: FAMILY MEDICINE CLINIC | Facility: CLINIC | Age: 35
End: 2020-05-20

## 2020-05-20 VITALS
BODY MASS INDEX: 37.06 KG/M2 | DIASTOLIC BLOOD PRESSURE: 84 MMHG | TEMPERATURE: 97.7 F | WEIGHT: 262 LBS | OXYGEN SATURATION: 97 % | HEART RATE: 77 BPM | SYSTOLIC BLOOD PRESSURE: 141 MMHG

## 2020-05-20 DIAGNOSIS — M25.562 ACUTE PAIN OF LEFT KNEE: Primary | ICD-10-CM

## 2020-05-20 PROCEDURE — 99213 OFFICE O/P EST LOW 20 MIN: CPT | Performed by: FAMILY MEDICINE

## 2020-05-20 RX ORDER — LATANOPROST 50 UG/ML
SOLUTION/ DROPS OPHTHALMIC
COMMUNITY
Start: 2020-02-12

## 2020-05-20 RX ORDER — HYDROCODONE BITARTRATE AND ACETAMINOPHEN 5; 325 MG/1; MG/1
1 TABLET ORAL EVERY 8 HOURS PRN
Qty: 15 TABLET | Refills: 0 | Status: SHIPPED | OUTPATIENT
Start: 2020-05-20 | End: 2020-06-24

## 2020-05-20 NOTE — PROGRESS NOTES
Subjective   Chief Complaint   Patient presents with   • Knee Pain     left x  slipped while hiking     Paul Vaughn is a 35 y.o. male.     He was hiking at [place] last weekend and slipped.    Knee Pain    The incident occurred 3 to 5 days ago. The incident occurred at the park. The injury mechanism was a fall. The pain is present in the left knee. The quality of the pain is described as aching. The pain is moderate. The pain has been worsening since onset. Associated symptoms include a loss of motion. Pertinent negatives include no muscle weakness, numbness or tingling. He reports no foreign bodies present. The symptoms are aggravated by movement. He has tried rest, ice and heat for the symptoms. The treatment provided no relief.      Past Medical History:   Diagnosis Date   • Allergic    • Anxiety    • Arthritis    • Depression    • Gout    • Insomnia    • Pneumonia      Past Surgical History:   Procedure Laterality Date   • VASECTOMY     • WISDOM TOOTH EXTRACTION       Allergies   Allergen Reactions   • Codeine Other (See Comments)     Pt told as a child     Social History     Socioeconomic History   • Marital status:      Spouse name: Not on file   • Number of children: Not on file   • Years of education: Not on file   • Highest education level: Not on file   Tobacco Use   • Smoking status: Former Smoker     Last attempt to quit: 2016     Years since quittin.3   • Smokeless tobacco: Never Used   Substance and Sexual Activity   • Alcohol use: No     Frequency: Never   • Drug use: Defer   • Sexual activity: Defer     Social History     Tobacco Use   Smoking Status Former Smoker   • Last attempt to quit:    • Years since quittin.3   Smokeless Tobacco Never Used       family history includes Alcohol abuse in an other family member; Allergies in an other family member; Arthritis in an other family member; Gout in an other family member; Mental illness in his mother.  Current  Outpatient Medications on File Prior to Visit   Medication Sig Dispense Refill   • allopurinol (ZYLOPRIM) 300 MG tablet TAKE 1 TABLET BY MOUTH EVERY DAY 90 tablet 3   • latanoprost (XALATAN) 0.005 % ophthalmic solution INSTILL 1 DROP INTO BOTH EYES EVERY DAY IN THE EVENING     • [DISCONTINUED] amoxicillin (AMOXIL) 500 MG capsule      • [DISCONTINUED] naproxen (NAPROSYN) 500 MG tablet Take 1 tablet by mouth 2 (Two) Times a Day With Meals. 60 tablet 2     No current facility-administered medications on file prior to visit.      Patient Active Problem List   Diagnosis   • Gout   • Left foot pain   • Hypertriglyceridemia       The following portions of the patient's history were reviewed and updated as appropriate: allergies, current medications, past family history, past medical history, past social history, past surgical history and problem list.    Review of Systems   Constitutional: Negative for chills and fever.   HENT: Negative for sinus pressure and sore throat.    Eyes: Negative for blurred vision.   Respiratory: Negative for cough and shortness of breath.    Cardiovascular: Negative for chest pain and palpitations.   Gastrointestinal: Negative for abdominal pain.   Endocrine: Negative for polyuria.   Skin: Negative for rash.   Neurological: Negative for dizziness, tingling, numbness and headache.   Hematological: Negative for adenopathy.   Psychiatric/Behavioral: Negative for depressed mood.       Objective   /84 (BP Location: Right arm, Patient Position: Sitting, Cuff Size: Adult)   Pulse 77   Temp 97.7 °F (36.5 °C) Comment: contactless  Wt 119 kg (262 lb)   SpO2 97%   BMI 37.06 kg/m²   Physical Exam   Constitutional: He is oriented to person, place, and time. He appears well-developed. No distress.   HENT:   Head: Normocephalic.   Eyes: Conjunctivae and lids are normal.   Neck: Trachea normal. No thyroid mass and no thyromegaly present.   Cardiovascular: Normal rate, regular rhythm and normal heart  sounds.   Pulmonary/Chest: Effort normal and breath sounds normal.   Musculoskeletal:        Left knee: He exhibits swelling. Tenderness found. Patellar tendon tenderness noted.   Lymphadenopathy:     He has no cervical adenopathy.   Neurological: He is alert and oriented to person, place, and time.   Skin: Skin is warm and dry.   Psychiatric: He has a normal mood and affect. His speech is normal and behavior is normal. He is attentive.       No visits with results within 1 Week(s) from this visit.   Latest known visit with results is:   No results found for any previous visit.           Assessment/Plan   Problems Addressed this Visit        Musculoskeletal and Integument    Acute pain of left knee - Primary    Relevant Medications    HYDROcodone-acetaminophen (Norco) 5-325 MG per tablet    Other Relevant Orders    XR Knee 3 View Left

## 2020-05-26 DIAGNOSIS — M25.562 ACUTE PAIN OF LEFT KNEE: Primary | ICD-10-CM

## 2020-06-04 ENCOUNTER — OFFICE VISIT (OUTPATIENT)
Dept: ORTHOPEDIC SURGERY | Facility: CLINIC | Age: 35
End: 2020-06-04

## 2020-06-04 VITALS
SYSTOLIC BLOOD PRESSURE: 127 MMHG | BODY MASS INDEX: 36.96 KG/M2 | HEIGHT: 71 IN | WEIGHT: 264 LBS | DIASTOLIC BLOOD PRESSURE: 83 MMHG | HEART RATE: 73 BPM

## 2020-06-04 DIAGNOSIS — M25.562 ACUTE PAIN OF LEFT KNEE: Primary | ICD-10-CM

## 2020-06-04 PROCEDURE — 99214 OFFICE O/P EST MOD 30 MIN: CPT | Performed by: ORTHOPAEDIC SURGERY

## 2020-06-04 NOTE — PROGRESS NOTES
"     Patient ID: Paul Vaughn is a 35 y.o. male.    Chief Complaint:    Chief Complaint   Patient presents with   • Left Knee - Consult       HPI:  Paul is a 35-year-old gentleman here in consultation from Dr. Aiken for left knee pain.  About 3 to 4 weeks ago he was hiking and jumped off a rock and felt his knee pop.  He developed immediate swelling in the knee.  Since that time swelling is slowly subsided but he has continued medial joint line tenderness with popping on bending and straightening his knee.  His knee feels like it gives out at times.  He has tried to rest and take oral medication but pain and instability continues with locking sensation.  Pain is sharp and a 6/10  Past Medical History:   Diagnosis Date   • Allergic    • Anxiety    • Arthritis    • Depression    • Gout    • Insomnia    • Pneumonia        Past Surgical History:   Procedure Laterality Date   • VASECTOMY     • WISDOM TOOTH EXTRACTION         Family History   Problem Relation Age of Onset   • Mental illness Mother         Psychiatric care.   • Allergies Other    • Gout Other    • Alcohol abuse Other    • Arthritis Other           Social History     Occupational History   • Not on file   Tobacco Use   • Smoking status: Former Smoker     Last attempt to quit: 2016     Years since quittin.4   • Smokeless tobacco: Never Used   Substance and Sexual Activity   • Alcohol use: No     Frequency: Never   • Drug use: Defer   • Sexual activity: Defer      Review of Systems   Cardiovascular: Negative for chest pain.   Musculoskeletal: Positive for arthralgias.       Objective:    /83   Pulse 73   Ht 179.1 cm (70.5\")   Wt 120 kg (264 lb)   BMI 37.34 kg/m²     Physical Examination:  He is a pleasant male in no distress. He is alert and oriented x3 and appears his stated age.  Left knee demonstrates no scars and no atrophy with a mild effusion and moderate medial joint line tenderness, range of motion is 2 to 110 degrees with " no varus or valgus laxity.  There is guarding on Lachman but it appears negative as does anterior and posterior drawer.  Nicole's is positive medial and lateral.Sensory and motor exam are intact all distributions. Dorsalis pedis and posterior tibialis pulses are palpable and capillary refill is less than two seconds to all digits    Imaging:  X-rays demonstrate well-maintained joint spaces with an effusion but no fracture    Assessment:  Left knee pain possible ligamentous versus meniscus tear    Plan:  I recommend MRI to evaluate for meniscus or ligament tear          Disclaimer: Please note that areas of this note were completed with computer voice recognition software.  Quite often unanticipated grammatical, syntax, homophones, and other interpretive errors are inadvertently transcribed by the computer software. Please excuse any errors that have escaped final proofreading.

## 2020-06-09 ENCOUNTER — HOSPITAL ENCOUNTER (OUTPATIENT)
Dept: MRI IMAGING | Facility: HOSPITAL | Age: 35
Discharge: HOME | End: 2020-06-09
Admitting: ORTHOPAEDIC SURGERY

## 2020-06-09 DIAGNOSIS — M25.562 ACUTE PAIN OF LEFT KNEE: ICD-10-CM

## 2020-06-09 PROCEDURE — 73721 MRI JNT OF LWR EXTRE W/O DYE: CPT

## 2020-06-24 ENCOUNTER — OFFICE VISIT (OUTPATIENT)
Dept: ORTHOPEDIC SURGERY | Facility: CLINIC | Age: 35
End: 2020-06-24

## 2020-06-24 VITALS
BODY MASS INDEX: 36.96 KG/M2 | HEART RATE: 72 BPM | WEIGHT: 264 LBS | DIASTOLIC BLOOD PRESSURE: 77 MMHG | HEIGHT: 71 IN | SYSTOLIC BLOOD PRESSURE: 117 MMHG

## 2020-06-24 DIAGNOSIS — M25.562 ACUTE PAIN OF LEFT KNEE: Primary | ICD-10-CM

## 2020-06-24 PROCEDURE — 99212 OFFICE O/P EST SF 10 MIN: CPT | Performed by: ORTHOPAEDIC SURGERY

## 2020-06-24 NOTE — PROGRESS NOTES
"     Patient ID: Paul Vaughn is a 35 y.o. male.  Left knee pain  Paul is a 35-year-old gentleman here with left knee pain, pain has improved about 50% in the last couple weeks.  Still some occasional popping but minimal swelling    Review of Systems:  Left knee pain  Denies chest pain      Objective:    /77   Pulse 72   Ht 179.1 cm (70.5\")   Wt 120 kg (264 lb)   BMI 37.34 kg/m²     Physical Examination:   He is a pleasant male in no distress. He is alert and oriented x3 and appears his stated age.  Left knee demonstrates no scars and no atrophy with a mild effusion and mild medial joint line tenderness, range of motion is 0 to 110 degrees with no varus or valgus laxity.  \Lachman, anterior, posterior drawer negative.  Nicole's is negative medial and lateral.Sensory and motor exam are intact all distributions. Dorsalis pedis and posterior tibialis pulses are palpable and capillary refill is less than two seconds to all digits      Imaging:   MRI demonstrates chondral fissure of the patella    Assessment:    Left knee patellofemoral arthritis    Plan:  Recommend conservative treatment with low impact exercise program, anti-inflammatories and possible bracing as needed, see me as needed          Disclaimer: Please note that areas of this note were completed with computer voice recognition software.  Quite often unanticipated grammatical, syntax, homophones, and other interpretive errors are inadvertently transcribed by the computer software. Please excuse any errors that have escaped final proofreading.  "

## 2021-01-08 ENCOUNTER — TELEPHONE (OUTPATIENT)
Dept: FAMILY MEDICINE CLINIC | Facility: CLINIC | Age: 36
End: 2021-01-08

## 2021-01-08 RX ORDER — INDOMETHACIN 50 MG/1
50 CAPSULE ORAL
Qty: 30 CAPSULE | Refills: 1 | Status: SHIPPED | OUTPATIENT
Start: 2021-01-08 | End: 2021-03-03

## 2021-01-08 NOTE — TELEPHONE ENCOUNTER
Caller: Roderick Paul L    Relationship: Self    Best call back number: [phone]       What medication are you requesting:ENDOMETHACINE    What are your current symptoms: RIGHT KNEE SWOLLEN , WARM TO HE TOUCH   How long have you been experiencing symptoms: 2 DAYS     Have you had these symptoms before:    [x] Yes  [] No    Have you been treated for these symptoms before:   [x] Yes  [] No    If a prescription is needed, what is your preferred pharmacy and phone number:  [facility]/pharmacy #[id number] - [address] [phone] [facility] [phone]   [phone]    Additional notes:    MR. CARCAMO SAYS HE IS HAVING AN ACUTE GOUT FLARE UP.

## 2021-03-03 ENCOUNTER — OFFICE VISIT (OUTPATIENT)
Dept: FAMILY MEDICINE CLINIC | Facility: CLINIC | Age: 36
End: 2021-03-03

## 2021-03-03 VITALS
SYSTOLIC BLOOD PRESSURE: 148 MMHG | HEART RATE: 72 BPM | TEMPERATURE: 98.2 F | OXYGEN SATURATION: 97 % | DIASTOLIC BLOOD PRESSURE: 78 MMHG | BODY MASS INDEX: 39.61 KG/M2 | WEIGHT: 280 LBS

## 2021-03-03 DIAGNOSIS — F34.1 DYSTHYMIA: ICD-10-CM

## 2021-03-03 DIAGNOSIS — M79.672 LEFT FOOT PAIN: Primary | ICD-10-CM

## 2021-03-03 DIAGNOSIS — E66.01 MORBIDLY OBESE (HCC): ICD-10-CM

## 2021-03-03 PROCEDURE — 99214 OFFICE O/P EST MOD 30 MIN: CPT | Performed by: FAMILY MEDICINE

## 2021-03-03 RX ORDER — NAPROXEN 500 MG/1
TABLET, DELAYED RELEASE ORAL
COMMUNITY
Start: 2021-02-24 | End: 2021-03-03 | Stop reason: SDUPTHER

## 2021-03-03 RX ORDER — ESCITALOPRAM OXALATE 5 MG/1
5 TABLET ORAL DAILY
Qty: 90 TABLET | Refills: 1 | Status: SHIPPED | OUTPATIENT
Start: 2021-03-03 | End: 2021-08-23

## 2021-03-03 RX ORDER — NAPROXEN 500 MG/1
500 TABLET, DELAYED RELEASE ORAL 2 TIMES DAILY WITH MEALS
Qty: 180 TABLET | Refills: 1 | Status: SHIPPED | OUTPATIENT
Start: 2021-03-03 | End: 2021-03-10
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
## 2021-03-10 ENCOUNTER — OFFICE VISIT (OUTPATIENT)
Dept: FAMILY MEDICINE CLINIC | Facility: CLINIC | Age: 36
End: 2021-03-10

## 2021-03-10 ENCOUNTER — LAB (OUTPATIENT)
Dept: FAMILY MEDICINE CLINIC | Facility: CLINIC | Age: 36
End: 2021-03-10

## 2021-03-10 VITALS — TEMPERATURE: 98 F | SYSTOLIC BLOOD PRESSURE: 169 MMHG | DIASTOLIC BLOOD PRESSURE: 117 MMHG | HEART RATE: 97 BPM

## 2021-03-10 DIAGNOSIS — M1A.09X0 CHRONIC GOUT OF MULTIPLE SITES, UNSPECIFIED CAUSE: ICD-10-CM

## 2021-03-10 DIAGNOSIS — I10 ESSENTIAL HYPERTENSION: ICD-10-CM

## 2021-03-10 DIAGNOSIS — M25.561 ACUTE PAIN OF RIGHT KNEE: Primary | ICD-10-CM

## 2021-03-10 LAB
ALBUMIN SERPL-MCNC: 4.5 G/DL (ref 3.5–5.2)
ALBUMIN/GLOB SERPL: 1.3 G/DL
ALP SERPL-CCNC: 85 U/L (ref 39–117)
ALT SERPL W P-5'-P-CCNC: 44 U/L (ref 1–41)
ANION GAP SERPL CALCULATED.3IONS-SCNC: 8 MMOL/L (ref 5–15)
AST SERPL-CCNC: 25 U/L (ref 1–40)
BASOPHILS # BLD AUTO: 0.04 10*3/MM3 (ref 0–0.2)
BASOPHILS NFR BLD AUTO: 0.4 % (ref 0–1.5)
BILIRUB SERPL-MCNC: 0.4 MG/DL (ref 0–1.2)
BUN SERPL-MCNC: 14 MG/DL (ref 6–20)
BUN/CREAT SERPL: 13.2 (ref 7–25)
CALCIUM SPEC-SCNC: 9.9 MG/DL (ref 8.6–10.5)
CHLORIDE SERPL-SCNC: 100 MMOL/L (ref 98–107)
CO2 SERPL-SCNC: 28 MMOL/L (ref 22–29)
CREAT SERPL-MCNC: 1.06 MG/DL (ref 0.76–1.27)
DEPRECATED RDW RBC AUTO: 41.7 FL (ref 37–54)
EOSINOPHIL # BLD AUTO: 0.16 10*3/MM3 (ref 0–0.4)
EOSINOPHIL NFR BLD AUTO: 1.7 % (ref 0.3–6.2)
ERYTHROCYTE [DISTWIDTH] IN BLOOD BY AUTOMATED COUNT: 13.2 % (ref 12.3–15.4)
GFR SERPL CREATININE-BSD FRML MDRD: 78 ML/MIN/1.73
GLOBULIN UR ELPH-MCNC: 3.6 GM/DL
GLUCOSE SERPL-MCNC: 102 MG/DL (ref 65–99)
HCT VFR BLD AUTO: 49.7 % (ref 37.5–51)
HGB BLD-MCNC: 17.4 G/DL (ref 13–17.7)
IMM GRANULOCYTES # BLD AUTO: 0.05 10*3/MM3 (ref 0–0.05)
IMM GRANULOCYTES NFR BLD AUTO: 0.5 % (ref 0–0.5)
LYMPHOCYTES # BLD AUTO: 2.16 10*3/MM3 (ref 0.7–3.1)
LYMPHOCYTES NFR BLD AUTO: 23.1 % (ref 19.6–45.3)
MCH RBC QN AUTO: 30.5 PG (ref 26.6–33)
MCHC RBC AUTO-ENTMCNC: 35 G/DL (ref 31.5–35.7)
MCV RBC AUTO: 87.2 FL (ref 79–97)
MONOCYTES # BLD AUTO: 0.84 10*3/MM3 (ref 0.1–0.9)
MONOCYTES NFR BLD AUTO: 9 % (ref 5–12)
NEUTROPHILS NFR BLD AUTO: 6.12 10*3/MM3 (ref 1.7–7)
NEUTROPHILS NFR BLD AUTO: 65.3 % (ref 42.7–76)
NRBC BLD AUTO-RTO: 0 /100 WBC (ref 0–0.2)
PLATELET # BLD AUTO: 262 10*3/MM3 (ref 140–450)
PMV BLD AUTO: 11 FL (ref 6–12)
POTASSIUM SERPL-SCNC: 4 MMOL/L (ref 3.5–5.2)
PROT SERPL-MCNC: 8.1 G/DL (ref 6–8.5)
RBC # BLD AUTO: 5.7 10*6/MM3 (ref 4.14–5.8)
SODIUM SERPL-SCNC: 136 MMOL/L (ref 136–145)
WBC # BLD AUTO: 9.37 10*3/MM3 (ref 3.4–10.8)

## 2021-03-10 PROCEDURE — 85025 COMPLETE CBC W/AUTO DIFF WBC: CPT | Performed by: FAMILY MEDICINE

## 2021-03-10 PROCEDURE — 80053 COMPREHEN METABOLIC PANEL: CPT | Performed by: FAMILY MEDICINE

## 2021-03-10 PROCEDURE — 99214 OFFICE O/P EST MOD 30 MIN: CPT | Performed by: FAMILY MEDICINE

## 2021-03-10 PROCEDURE — 36415 COLL VENOUS BLD VENIPUNCTURE: CPT | Performed by: FAMILY MEDICINE

## 2021-03-10 RX ORDER — INDOMETHACIN 50 MG/1
CAPSULE ORAL
COMMUNITY
Start: 2021-03-08 | End: 2021-04-14

## 2021-03-10 RX ORDER — NAPROXEN SODIUM 220 MG
440 TABLET ORAL 2 TIMES DAILY PRN
COMMUNITY
End: 2021-04-14

## 2021-03-10 NOTE — PROGRESS NOTES
Chief Complaint  Joint Swelling (rt knee), Fatigue, Dizziness, and Abdominal Pain    Subjective          Paul Vaughn presents to [facility]  He has felt bad for the last few days.  He has been sleeping a lot because he is so fatigued.  He feels lightheaded at times. He recently had a flare up of gout in his right knee and has been taking indomethacin and otc Aleve and Allopurinol.    Knee Pain   The incident occurred more than 1 week ago. There was no injury mechanism. The pain is present in the right knee. The quality of the pain is described as aching. The pain is moderate. The pain has been constant since onset. Pertinent negatives include no inability to bear weight, muscle weakness or numbness. He reports no foreign bodies present. The symptoms are aggravated by movement and weight bearing. He has tried elevation, non-weight bearing, rest and NSAIDs for the symptoms. The treatment provided mild relief.   Hypertension  This is a chronic problem. The current episode started more than 1 year ago. The problem has been gradually worsening since onset. The problem is uncontrolled.       Objective   Vital Signs:   BP (!) 169/117 (BP Location: Right arm, Patient Position: Sitting, Cuff Size: Adult)   Pulse 97   Temp 98 °F (36.7 °C)     Physical Exam  Constitutional:       General: He is not in acute distress.     Appearance: He is well-developed.   HENT:      Head: Normocephalic.   Eyes:      General: Lids are normal.      Conjunctiva/sclera: Conjunctivae normal.   Neck:      Thyroid: No thyroid mass or thyromegaly.      Trachea: Trachea normal.   Cardiovascular:      Rate and Rhythm: Normal rate and regular rhythm.      Heart sounds: Normal heart sounds.   Pulmonary:      Effort: Pulmonary effort is normal.      Breath sounds: Normal breath sounds.   Abdominal:      Palpations: Abdomen is soft.   Musculoskeletal:      Cervical back: Normal range of motion.      Right knee:  Swelling present. Decreased range of motion. Tenderness present.   Lymphadenopathy:      Cervical: No cervical adenopathy.   Skin:     General: Skin is warm and dry.   Neurological:      Mental Status: He is alert and oriented to person, place, and time.   Psychiatric:         Attention and Perception: He is attentive.         Mood and Affect: Mood normal.         Speech: Speech normal.         Behavior: Behavior normal.        Result Review :   The following data was reviewed by: Jamia Aiken MD on 03/10/2021:  CMP    CMP 3/10/21   Glucose 102 (A)   BUN 14   Creatinine 1.06   eGFR Non African Am 78   Sodium 136   Potassium 4.0   Chloride 100   Calcium 9.9   Albumin 4.50   Total Bilirubin 0.4   Alkaline Phosphatase 85   AST (SGOT) 25   ALT (SGPT) 44 (A)   (A) Abnormal value            CBC    CBC 3/10/21   WBC 9.37   RBC 5.70   Hemoglobin 17.4   Hematocrit 49.7   MCV 87.2   MCH 30.5   MCHC 35.0   RDW 13.2   Platelets 262                                 Assessment and Plan    Diagnoses and all orders for this visit:    1. Acute pain of right knee (Primary)  -     Comprehensive Metabolic Panel  -     CBC & Differential    2. Chronic gout of multiple sites, unspecified cause  -     Comprehensive Metabolic Panel  -     CBC & Differential    3. Essential hypertension        Follow Up   No follow-ups on file.  Patient was given instructions and counseling regarding his condition or for health maintenance advice. Please see specific information pulled into the AVS if appropriate.

## 2021-03-11 ENCOUNTER — TELEPHONE (OUTPATIENT)
Dept: FAMILY MEDICINE CLINIC | Facility: CLINIC | Age: 36
End: 2021-03-11

## 2021-03-11 NOTE — TELEPHONE ENCOUNTER
PT CALLED TO MAKE SURE WE HAVE HIS CORRECT NUMBER, AS HE IS EXPECTING TEST RESULTS THIS AFTERNOON. HE REQUESTS THAT WE CALL BACK BEFORE CLOSING FOR THE DAY. [phone]

## 2021-03-12 NOTE — TELEPHONE ENCOUNTER
Caller: Paul Vaughn    Relationship: Self    Best call back number: [phone]    Caller requesting test results: YES    What test was performed: BLOOD WORK    When was the test performed: WEDNESDAY    Where was the test performed: IN OFFICE     Additional notes: PATIENT CALLED SAID HIS TEST RESULTS ARE BACK VIA [employer] BUT HE DOES NOT UNDERSTAND THE RESULTS. REQUESTING SOMEONE TO CALL TO DICUSS THESE RESULTS.

## 2021-03-21 PROBLEM — F34.1 DYSTHYMIA: Status: ACTIVE | Noted: 2021-03-21

## 2021-03-21 PROBLEM — E66.01 MORBIDLY OBESE (HCC): Status: ACTIVE | Noted: 2021-03-21

## 2021-03-21 NOTE — ASSESSMENT & PLAN NOTE
Patient's (Body mass index is 39.61 kg/m².) indicates that they are obese (BMI >30) with obesity-related health conditions that include hypertension . Obesity is worsening. BMI is is above average; BMI management plan is completed. We discussed portion control and increasing exercise.

## 2021-04-14 ENCOUNTER — TELEPHONE (OUTPATIENT)
Dept: FAMILY MEDICINE CLINIC | Facility: CLINIC | Age: 36
End: 2021-04-14

## 2021-04-14 RX ORDER — NAPROXEN 500 MG/1
500 TABLET ORAL 2 TIMES DAILY WITH MEALS
Qty: 180 TABLET | Refills: 0 | Status: SHIPPED | OUTPATIENT
Start: 2021-04-14 | End: 2021-07-06

## 2021-04-14 NOTE — TELEPHONE ENCOUNTER
Caller: Paul Vaughn    Relationship: Self    Best call back number: [phone]     Medication needed:   Requested Prescriptions      No prescriptions requested or ordered in this encounter   NAPROXEN - 500MG    When do you need the refill by: 04/14/21    What additional details did the patient provide when requesting the medication: PATIENT STATED THAT HE SHOULD HAVE HIS INSURANCE ISSUE FIXED NOW AND IS REQUESTING THE ORIGINAL PRESCRIPTION FOR THIS MEDICATION    Does the patient have less than a 3 day supply:  [x] Yes  [] No    What is the patient's preferred pharmacy: [facility]/PHARMACY #[id number] - [place], [place] - [address] [place] [address] [phone] [facility] [phone]

## 2021-05-24 RX ORDER — ALLOPURINOL 300 MG/1
TABLET ORAL
Qty: 90 TABLET | Refills: 3 | Status: SHIPPED | OUTPATIENT
Start: 2021-05-24 | End: 2022-06-17

## 2021-07-06 RX ORDER — NAPROXEN 500 MG/1
TABLET ORAL
Qty: 180 TABLET | Refills: 0 | Status: SHIPPED | OUTPATIENT
Start: 2021-07-06

## 2021-08-23 DIAGNOSIS — F34.1 DYSTHYMIA: ICD-10-CM

## 2021-08-23 RX ORDER — ESCITALOPRAM OXALATE 5 MG/1
TABLET ORAL
Qty: 90 TABLET | Refills: 1 | Status: SHIPPED | OUTPATIENT
Start: 2021-08-23 | End: 2022-03-08

## 2021-11-12 ENCOUNTER — OFFICE VISIT (OUTPATIENT)
Dept: FAMILY MEDICINE CLINIC | Facility: CLINIC | Age: 36
End: 2021-11-12

## 2021-11-12 VITALS
SYSTOLIC BLOOD PRESSURE: 119 MMHG | OXYGEN SATURATION: 98 % | TEMPERATURE: 97.3 F | BODY MASS INDEX: 37.77 KG/M2 | DIASTOLIC BLOOD PRESSURE: 81 MMHG | HEART RATE: 57 BPM | WEIGHT: 267 LBS

## 2021-11-12 DIAGNOSIS — K92.1 HEMATOCHEZIA: Primary | ICD-10-CM

## 2021-11-12 PROCEDURE — 99213 OFFICE O/P EST LOW 20 MIN: CPT | Performed by: FAMILY MEDICINE

## 2021-11-12 NOTE — PROGRESS NOTES
Chief Complaint  Rectal Bleeding ([facility] ER f/u ref to GI)    Subjective          Paul Vaughn presents to [facility]  He feels like he poops more than most other people.  He had some bleeding a few months ago and then it seemed to get better but it has happened again recently. He went to the ER at [facility] last week, records requested.    Rectal Bleeding  This is a recurrent problem. The problem occurs intermittently. The problem has been waxing and waning. Associated symptoms include abdominal pain and nausea. Pertinent negatives include no chest pain, chills, congestion, coughing or fatigue. The symptoms are aggravated by eating. He has tried nothing for the symptoms.       Objective   Vital Signs:   /81 (BP Location: Left arm, Patient Position: Sitting, Cuff Size: Adult)   Pulse 57   Temp 97.3 °F (36.3 °C) (Infrared)   Wt 121 kg (267 lb)   SpO2 98%   BMI 37.77 kg/m²     Physical Exam  Constitutional:       General: He is not in acute distress.     Appearance: He is well-developed.   HENT:      Head: Normocephalic.   Eyes:      General: Lids are normal.   Neck:      Thyroid: No thyroid mass or thyromegaly.      Trachea: Trachea normal.   Cardiovascular:      Rate and Rhythm: Normal rate and regular rhythm.      Heart sounds: Normal heart sounds.   Pulmonary:      Effort: Pulmonary effort is normal.      Breath sounds: Normal breath sounds.   Abdominal:      Palpations: Abdomen is soft.   Musculoskeletal:      Cervical back: Normal range of motion.   Lymphadenopathy:      Cervical: No cervical adenopathy.   Skin:     General: Skin is warm and dry.   Neurological:      Mental Status: He is alert and oriented to person, place, and time.   Psychiatric:         Attention and Perception: He is attentive.         Mood and Affect: Mood normal.         Speech: Speech normal.         Behavior: Behavior normal.        Result Review :   The following data was reviewed by: Jamia Dougherty  MD Attila on 11/12/2021:  Common labs    Common Labsle 3/10/21 3/10/21    1206 1206   Glucose  102 (A)   BUN  14   Creatinine  1.06   eGFR Non African Am  78   Sodium  136   Potassium  4.0   Chloride  100   Calcium  9.9   Albumin  4.50   Total Bilirubin  0.4   Alkaline Phosphatase  85   AST (SGOT)  25   ALT (SGPT)  44 (A)   WBC 9.37    Hemoglobin 17.4    Hematocrit 49.7    Platelets 262    (A) Abnormal value                      Assessment and Plan {CC Problem List  Visit Diagnosis   ROS  Review (Popup)  Health Maintenance  Quality  BestPractice  Medications  SmartSets  SnapShot Encounters  Media :23}   Diagnoses and all orders for this visit:    1. Hematochezia (Primary)  -     Ambulatory Referral For Screening Colonoscopy        Follow Up   No follow-ups on file.  Patient was given instructions and counseling regarding his condition or for health maintenance advice. Please see specific information pulled into the AVS if appropriate.

## 2022-03-08 DIAGNOSIS — F34.1 DYSTHYMIA: ICD-10-CM

## 2022-03-08 RX ORDER — ESCITALOPRAM OXALATE 5 MG/1
TABLET ORAL
Qty: 90 TABLET | Refills: 1 | Status: SHIPPED | OUTPATIENT
Start: 2022-03-08 | End: 2022-05-03

## 2022-05-03 ENCOUNTER — OFFICE VISIT (OUTPATIENT)
Dept: FAMILY MEDICINE CLINIC | Facility: CLINIC | Age: 37
End: 2022-05-03

## 2022-05-03 VITALS
SYSTOLIC BLOOD PRESSURE: 131 MMHG | BODY MASS INDEX: 37.06 KG/M2 | TEMPERATURE: 98.1 F | DIASTOLIC BLOOD PRESSURE: 84 MMHG | WEIGHT: 262 LBS | OXYGEN SATURATION: 97 % | HEART RATE: 76 BPM

## 2022-05-03 DIAGNOSIS — R19.7 DIARRHEA, UNSPECIFIED TYPE: ICD-10-CM

## 2022-05-03 DIAGNOSIS — F34.1 DYSTHYMIA: ICD-10-CM

## 2022-05-03 DIAGNOSIS — L02.31 ABSCESS OF LEFT BUTTOCK: Primary | ICD-10-CM

## 2022-05-03 PROCEDURE — 99214 OFFICE O/P EST MOD 30 MIN: CPT | Performed by: FAMILY MEDICINE

## 2022-05-03 RX ORDER — ESCITALOPRAM OXALATE 10 MG/1
10 TABLET ORAL DAILY
Qty: 90 TABLET | Refills: 1 | Status: SHIPPED | OUTPATIENT
Start: 2022-05-03 | End: 2022-12-19

## 2022-05-03 RX ORDER — CEPHALEXIN 500 MG/1
500 CAPSULE ORAL 3 TIMES DAILY
Qty: 21 CAPSULE | Refills: 0 | Status: SHIPPED | OUTPATIENT
Start: 2022-05-03

## 2022-05-03 NOTE — PROGRESS NOTES
Chief Complaint  Follow-up (Wants to discuss his Lexapro, thinks he had a panic attack. )    Subjective          Paul Vaughn presents to [facility]  He had a boil on his left buttock last weekend.  It ruptured in 2 areas and drained.  He is feeling better now.  He was out of hot water for about 2 weeks and unable to bathe properly for a while.  No fever or chills.  No further drainage or pain.      Runny nose, post nasal drainage, emesis, left sided abdominal pain, increase stress recently.  10-12 bowel movements per day    Rash  This is a new problem. The current episode started in the past 7 days. The problem has been gradually improving since onset. Location: left buttock. The rash is characterized by redness, pain and draining. He was exposed to nothing. Associated symptoms include diarrhea. Pertinent negatives include no anorexia, fatigue or fever.   GI Problem  The primary symptoms include abdominal pain, diarrhea and rash. Primary symptoms do not include fever, weight loss or fatigue. The illness began more than 7 days ago. The problem has not changed since onset.  The diarrhea began more than 1 week ago. The diarrhea is semi-solid and oily. The diarrhea occurs more than 10 times per day.   The illness is also significant for bloating. The illness does not include chills or anorexia.       Objective   Vital Signs:   /84 (BP Location: Left arm, Patient Position: Sitting, Cuff Size: Adult)   Pulse 76   Temp 98.1 °F (36.7 °C) (Infrared)   Wt 119 kg (262 lb)   SpO2 97%   BMI 37.06 kg/m²     Physical Exam  Constitutional:       General: He is not in acute distress.     Appearance: He is well-developed.   HENT:      Head: Normocephalic.   Eyes:      General: Lids are normal.      Conjunctiva/sclera: Conjunctivae normal.   Neck:      Thyroid: No thyroid mass or thyromegaly.      Trachea: Trachea normal.   Cardiovascular:      Rate and Rhythm: Normal rate and  regular rhythm.      Heart sounds: Normal heart sounds.   Pulmonary:      Effort: Pulmonary effort is normal.      Breath sounds: Normal breath sounds.   Abdominal:      Palpations: Abdomen is soft.   Musculoskeletal:      Cervical back: Normal range of motion.   Lymphadenopathy:      Cervical: No cervical adenopathy.   Skin:     General: Skin is warm and dry.      Findings: Erythema present.      Comments: Left medial buttock cheek near gluteal fold   Neurological:      Mental Status: He is alert and oriented to person, place, and time.   Psychiatric:         Attention and Perception: He is attentive.         Mood and Affect: Mood normal.         Speech: Speech normal.         Behavior: Behavior normal.        Result Review :   The following data was reviewed by: Jamia Aiken MD on 05/03/2022:                Assessment and Plan    Diagnoses and all orders for this visit:    1. Abscess of left buttock (Primary)  Assessment & Plan:  Keflex as prescribed.  Call if any return of symptoms.    Orders:  -     cephalexin (Keflex) 500 MG capsule; Take 1 capsule by mouth 3 (Three) Times a Day.  Dispense: 21 capsule; Refill: 0    2. Diarrhea, unspecified type  Assessment & Plan:  Possible pancreatic enzyme insufficiency    Orders:  -     Ambulatory Referral to Gastroenterology    3. Dysthymia  Assessment & Plan:  Patient's depression is recurrent and is moderate without psychosis. Their depression is currently active and the condition is worsening. This will be reassessed at the next regular appointment. F/U as described:patient was prescribed an antidepressant medicine.    Orders:  -     escitalopram (Lexapro) 10 MG tablet; Take 1 tablet by mouth Daily.  Dispense: 90 tablet; Refill: 1    BMI has not been calculated during today's encounter.          Follow Up   No follow-ups on file.  Patient was given instructions and counseling regarding his condition or for health maintenance advice. Please see specific information  pulled into the AVS if appropriate.

## 2022-06-17 RX ORDER — ALLOPURINOL 300 MG/1
TABLET ORAL
Qty: 90 TABLET | Refills: 3 | Status: SHIPPED | OUTPATIENT
Start: 2022-06-17

## 2022-06-23 ENCOUNTER — OFFICE (OUTPATIENT)
Dept: URBAN - METROPOLITAN AREA CLINIC 64 | Facility: CLINIC | Age: 37
End: 2022-06-23

## 2022-06-23 VITALS
DIASTOLIC BLOOD PRESSURE: 87 MMHG | SYSTOLIC BLOOD PRESSURE: 135 MMHG | HEART RATE: 79 BPM | HEIGHT: 71 IN | WEIGHT: 260 LBS

## 2022-06-23 DIAGNOSIS — K52.9 NONINFECTIVE GASTROENTERITIS AND COLITIS, UNSPECIFIED: ICD-10-CM

## 2022-06-23 PROCEDURE — 99204 OFFICE O/P NEW MOD 45 MIN: CPT | Performed by: NURSE PRACTITIONER

## 2022-06-23 RX ORDER — DICYCLOMINE HYDROCHLORIDE 20 MG/1
60 TABLET ORAL
Qty: 90 | Refills: 6 | Status: ACTIVE
Start: 2022-06-23

## 2022-07-22 ENCOUNTER — ON CAMPUS - OUTPATIENT (OUTPATIENT)
Dept: URBAN - METROPOLITAN AREA HOSPITAL 2 | Facility: HOSPITAL | Age: 37
End: 2022-07-22

## 2022-07-22 ENCOUNTER — OFFICE (OUTPATIENT)
Dept: URBAN - METROPOLITAN AREA PATHOLOGY 4 | Facility: PATHOLOGY | Age: 37
End: 2022-07-22

## 2022-07-22 VITALS
OXYGEN SATURATION: 100 % | DIASTOLIC BLOOD PRESSURE: 56 MMHG | DIASTOLIC BLOOD PRESSURE: 95 MMHG | SYSTOLIC BLOOD PRESSURE: 134 MMHG | RESPIRATION RATE: 18 BRPM | HEART RATE: 60 BPM | DIASTOLIC BLOOD PRESSURE: 88 MMHG | HEART RATE: 66 BPM | HEART RATE: 63 BPM | SYSTOLIC BLOOD PRESSURE: 133 MMHG | SYSTOLIC BLOOD PRESSURE: 141 MMHG | HEART RATE: 69 BPM | HEART RATE: 61 BPM | SYSTOLIC BLOOD PRESSURE: 96 MMHG | SYSTOLIC BLOOD PRESSURE: 112 MMHG | DIASTOLIC BLOOD PRESSURE: 83 MMHG | SYSTOLIC BLOOD PRESSURE: 113 MMHG | TEMPERATURE: 97.8 F | RESPIRATION RATE: 16 BRPM | HEIGHT: 71 IN | SYSTOLIC BLOOD PRESSURE: 130 MMHG | SYSTOLIC BLOOD PRESSURE: 150 MMHG | DIASTOLIC BLOOD PRESSURE: 64 MMHG | HEART RATE: 58 BPM | OXYGEN SATURATION: 99 % | HEART RATE: 70 BPM | WEIGHT: 249 LBS | DIASTOLIC BLOOD PRESSURE: 87 MMHG | DIASTOLIC BLOOD PRESSURE: 73 MMHG | HEART RATE: 62 BPM

## 2022-07-22 DIAGNOSIS — K31.89 OTHER DISEASES OF STOMACH AND DUODENUM: ICD-10-CM

## 2022-07-22 DIAGNOSIS — K62.5 HEMORRHAGE OF ANUS AND RECTUM: ICD-10-CM

## 2022-07-22 DIAGNOSIS — R11.2 NAUSEA WITH VOMITING, UNSPECIFIED: ICD-10-CM

## 2022-07-22 DIAGNOSIS — K44.9 DIAPHRAGMATIC HERNIA WITHOUT OBSTRUCTION OR GANGRENE: ICD-10-CM

## 2022-07-22 DIAGNOSIS — K63.5 POLYP OF COLON: ICD-10-CM

## 2022-07-22 DIAGNOSIS — K52.9 NONINFECTIVE GASTROENTERITIS AND COLITIS, UNSPECIFIED: ICD-10-CM

## 2022-07-22 DIAGNOSIS — D12.5 BENIGN NEOPLASM OF SIGMOID COLON: ICD-10-CM

## 2022-07-22 LAB
GI HISTOLOGY: A. UNSPECIFIED: (no result)
GI HISTOLOGY: B. UNSPECIFIED: (no result)
GI HISTOLOGY: C. SELECT: (no result)
GI HISTOLOGY: D. SELECT: (no result)
GI HISTOLOGY: E. UNSPECIFIED: (no result)
GI HISTOLOGY: PDF REPORT: (no result)

## 2022-07-22 PROCEDURE — 43239 EGD BIOPSY SINGLE/MULTIPLE: CPT | Performed by: INTERNAL MEDICINE

## 2022-07-22 PROCEDURE — 45380 COLONOSCOPY AND BIOPSY: CPT | Mod: 59 | Performed by: INTERNAL MEDICINE

## 2022-07-22 PROCEDURE — 45385 COLONOSCOPY W/LESION REMOVAL: CPT | Performed by: INTERNAL MEDICINE

## 2022-07-22 PROCEDURE — 88305 TISSUE EXAM BY PATHOLOGIST: CPT | Performed by: INTERNAL MEDICINE

## 2022-07-22 PROCEDURE — 88342 IMHCHEM/IMCYTCHM 1ST ANTB: CPT | Performed by: INTERNAL MEDICINE

## 2022-07-22 RX ORDER — PANTOPRAZOLE SODIUM 20 MG/1
20 TABLET, DELAYED RELEASE ORAL
Qty: 90 | Refills: 3 | Status: ACTIVE
Start: 2022-07-22

## 2022-12-19 DIAGNOSIS — F34.1 DYSTHYMIA: ICD-10-CM

## 2022-12-19 RX ORDER — ESCITALOPRAM OXALATE 10 MG/1
TABLET ORAL
Qty: 90 TABLET | Refills: 1 | Status: SHIPPED | OUTPATIENT
Start: 2022-12-19

## 2024-01-08 DIAGNOSIS — F34.1 DYSTHYMIA: ICD-10-CM

## 2024-01-08 RX ORDER — ESCITALOPRAM OXALATE 10 MG/1
TABLET ORAL
Qty: 90 TABLET | Refills: 1 | Status: SHIPPED | OUTPATIENT
Start: 2024-01-08

## 2024-02-02 ENCOUNTER — OFFICE VISIT (OUTPATIENT)
Dept: FAMILY MEDICINE CLINIC | Facility: CLINIC | Age: 39
End: 2024-02-02
Payer: COMMERCIAL

## 2024-02-02 VITALS
HEART RATE: 64 BPM | DIASTOLIC BLOOD PRESSURE: 86 MMHG | HEIGHT: 71 IN | OXYGEN SATURATION: 98 % | BODY MASS INDEX: 35.64 KG/M2 | TEMPERATURE: 98.4 F | WEIGHT: 254.6 LBS | SYSTOLIC BLOOD PRESSURE: 129 MMHG

## 2024-02-02 DIAGNOSIS — F33.1 MODERATE EPISODE OF RECURRENT MAJOR DEPRESSIVE DISORDER: Primary | ICD-10-CM

## 2024-02-02 PROCEDURE — 99213 OFFICE O/P EST LOW 20 MIN: CPT | Performed by: FAMILY MEDICINE

## 2024-02-02 RX ORDER — BUPROPION HYDROCHLORIDE 150 MG/1
150 TABLET ORAL DAILY
Qty: 90 TABLET | Refills: 1 | Status: SHIPPED | OUTPATIENT
Start: 2024-02-02

## 2024-02-08 ENCOUNTER — TELEPHONE (OUTPATIENT)
Dept: FAMILY MEDICINE CLINIC | Facility: CLINIC | Age: 39
End: 2024-02-08
Payer: COMMERCIAL

## 2024-02-09 NOTE — TELEPHONE ENCOUNTER
PATIENT CALLED BACK AGAIN INQUIRING ABOUT HIS LA PAPERWORK. HE SAID HE SPOKE TO YOU ABOUT IT AND THAT YOU KNOW HE NEEDS IT BY 2/13. TIMMY TOLD HIM ABOUT HIS LIFESPRING INFORMATION, BUT HE SAID THAT'S NOT WHAT HE'S TAKING ABOUT. I TOLD HIM I WOULD CALL HIM BACK AS SOON AS YOU RESPONDED TO MY MESSAGE.    THANKS

## 2024-04-23 ENCOUNTER — LAB (OUTPATIENT)
Dept: FAMILY MEDICINE CLINIC | Facility: CLINIC | Age: 39
End: 2024-04-23
Payer: COMMERCIAL

## 2024-04-23 ENCOUNTER — OFFICE VISIT (OUTPATIENT)
Dept: FAMILY MEDICINE CLINIC | Facility: CLINIC | Age: 39
End: 2024-04-23
Payer: COMMERCIAL

## 2024-04-23 VITALS
SYSTOLIC BLOOD PRESSURE: 134 MMHG | BODY MASS INDEX: 35.9 KG/M2 | OXYGEN SATURATION: 98 % | HEART RATE: 65 BPM | WEIGHT: 256.4 LBS | TEMPERATURE: 98.7 F | DIASTOLIC BLOOD PRESSURE: 87 MMHG | HEIGHT: 71 IN

## 2024-04-23 DIAGNOSIS — R19.7 DIARRHEA, UNSPECIFIED TYPE: ICD-10-CM

## 2024-04-23 DIAGNOSIS — R07.9 CHEST PAIN, UNSPECIFIED TYPE: Primary | ICD-10-CM

## 2024-04-23 DIAGNOSIS — F33.1 MODERATE EPISODE OF RECURRENT MAJOR DEPRESSIVE DISORDER: ICD-10-CM

## 2024-04-23 DIAGNOSIS — G47.9 SLEEP DISTURBANCE: ICD-10-CM

## 2024-04-23 DIAGNOSIS — M25.562 ACUTE PAIN OF LEFT KNEE: ICD-10-CM

## 2024-04-23 DIAGNOSIS — R06.83 SNORES: ICD-10-CM

## 2024-04-23 DIAGNOSIS — M1A.09X0 CHRONIC GOUT OF MULTIPLE SITES, UNSPECIFIED CAUSE: Chronic | ICD-10-CM

## 2024-04-23 PROBLEM — L02.31 ABSCESS OF LEFT BUTTOCK: Status: RESOLVED | Noted: 2022-05-03 | Resolved: 2024-04-23

## 2024-04-23 PROCEDURE — 99214 OFFICE O/P EST MOD 30 MIN: CPT | Performed by: FAMILY MEDICINE

## 2024-04-23 PROCEDURE — 80061 LIPID PANEL: CPT | Performed by: FAMILY MEDICINE

## 2024-04-23 PROCEDURE — 80050 GENERAL HEALTH PANEL: CPT | Performed by: FAMILY MEDICINE

## 2024-04-23 PROCEDURE — 84550 ASSAY OF BLOOD/URIC ACID: CPT | Performed by: FAMILY MEDICINE

## 2024-04-23 PROCEDURE — 36415 COLL VENOUS BLD VENIPUNCTURE: CPT | Performed by: FAMILY MEDICINE

## 2024-04-23 RX ORDER — ESCITALOPRAM OXALATE 10 MG/1
10 TABLET ORAL DAILY
Start: 2024-04-23

## 2024-04-23 NOTE — PROGRESS NOTES
Chief Complaint  Depression    Subjective        Paul Vaughn presents to [facility]  Depression  Presents for follow-up visit. Symptoms include depressed mood, excessive worry, insomnia and chest pain. Patient is not experiencing: confusion, decreased concentration, suicidal ideas, suicidal planning and thoughts of death.Symptoms occur most days.  The severity of symptoms is moderate.  His past medical history is significant for depression. Past treatments include non-SSRI antidepressants and SSRI. The treatment provides moderate relief. Prior compliance problems include medication issues (cost of Wellbutrin was too high).   Knee Pain   The incident occurred more than 1 week ago. The incident occurred at home. There was no injury mechanism. The pain is present in the left knee. The quality of the pain is described as aching. The pain is moderate. The pain has been Constant since onset. Pertinent negatives include no inability to bear weight. The symptoms are aggravated by weight bearing and movement. He has tried rest and ice for the symptoms.   GI Problem  The primary symptoms include diarrhea. Primary symptoms do not include fever or melena. The problem has not changed since onset.  The illness does not include chills, odynophagia or constipation. Significant associated medical issues include irritable bowel syndrome.   Insomnia  Associated symptoms include chest pain. Pertinent negatives include no chills or fever.   Chest Pain   This is a recurrent problem. The current episode started more than 1 month ago. The onset quality is undetermined. The problem occurs intermittently. The problem has been waxing and waning. The pain is present in the lateral region. The pain is moderate. The quality of the pain is described as burning and sharp. The pain does not radiate. Pertinent negatives include no fever.       Objective   Vital Signs:  /87 (BP Location: Left arm,  "Patient Position: Sitting, Cuff Size: Large Adult)   Pulse 65   Temp 98.7 °F (37.1 °C) (Infrared)   Ht 179.1 cm (70.5\")   Wt 116 kg (256 lb 6.4 oz)   SpO2 98%   BMI 36.27 kg/m²   Estimated body mass index is 36.27 kg/m² as calculated from the following:    Height as of this encounter: 179.1 cm (70.5\").    Weight as of this encounter: 116 kg (256 lb 6.4 oz).       Class 2 Severe Obesity (BMI >=35 and <=39.9). Obesity-related health conditions include the following: dyslipidemias. Obesity is unchanged. BMI is is above average; BMI management plan is completed. We discussed portion control and increasing exercise.         Physical Exam  Vitals and nursing note reviewed.   Constitutional:       General: He is not in acute distress.     Appearance: He is well-developed.   HENT:      Head: Normocephalic.   Eyes:      General: Lids are normal.      Conjunctiva/sclera: Conjunctivae normal.   Neck:      Thyroid: No thyroid mass or thyromegaly.      Trachea: Trachea normal.   Cardiovascular:      Rate and Rhythm: Normal rate and regular rhythm.      Heart sounds: Normal heart sounds.   Pulmonary:      Effort: Pulmonary effort is normal.      Breath sounds: Normal breath sounds.   Abdominal:      Palpations: Abdomen is soft.   Musculoskeletal:      Cervical back: Normal range of motion.   Lymphadenopathy:      Cervical: No cervical adenopathy.   Skin:     General: Skin is warm and dry.   Neurological:      Mental Status: He is alert and oriented to person, place, and time.   Psychiatric:         Attention and Perception: He is attentive.         Mood and Affect: Mood normal.         Speech: Speech normal.         Behavior: Behavior normal.        Result Review :    The following data was reviewed by: Jamia Aiken MD on 04/23/2024:                 Assessment and Plan     Diagnoses and all orders for this visit:    1. Chest pain, unspecified type (Primary)  Assessment & Plan:  Refer to cardiology for evaluation.ol " chest pain    Orders:  -     Ambulatory Referral to Cardiology  -     CBC & Differential  -     Comprehensive Metabolic Panel  -     Lipid Panel  -     TSH    2. Acute pain of left knee  -     XR Knee 3 View Left; Future    3. Moderate episode of recurrent major depressive disorder  Assessment & Plan:  Patient's depression is a recurrent episode that is moderate without psychosis. Depression is active and worsening. He is interested in [employer] testing.     Plan:   Continue current medication therapy     Followup in 6 months.     Orders:  -     escitalopram (LEXAPRO) 10 MG tablet; Take 1 tablet by mouth Daily.    4. Diarrhea, unspecified type  Assessment & Plan:  Letter written for patient so that he can go to the bathroom as needed while at work.       5. Snores  -     Ambulatory Referral to Sleep Medicine    6. Sleep disturbance  -     Ambulatory Referral to Sleep Medicine             Follow Up     Return in about 3 months (around 7/23/2024) for Annual physical.  Patient was given instructions and counseling regarding his condition or for health maintenance advice. Please see specific information pulled into the AVS if appropriate.

## 2024-04-23 NOTE — ASSESSMENT & PLAN NOTE
Patient's depression is a recurrent episode that is moderate without psychosis. Depression is active and worsening. He is interested in [employer] testing.     Plan:   Continue current medication therapy     Followup in 6 months.

## 2024-04-24 DIAGNOSIS — M1A.09X0 CHRONIC GOUT OF MULTIPLE SITES, UNSPECIFIED CAUSE: Primary | Chronic | ICD-10-CM

## 2024-04-24 LAB
ALBUMIN SERPL-MCNC: 4.1 G/DL (ref 3.5–5.2)
ALBUMIN/GLOB SERPL: 1.4 G/DL
ALP SERPL-CCNC: 77 U/L (ref 39–117)
ALT SERPL W P-5'-P-CCNC: 30 U/L (ref 1–41)
ANION GAP SERPL CALCULATED.3IONS-SCNC: 9.2 MMOL/L (ref 5–15)
AST SERPL-CCNC: 22 U/L (ref 1–40)
BASOPHILS # BLD AUTO: 0.03 10*3/MM3 (ref 0–0.2)
BASOPHILS NFR BLD AUTO: 0.5 % (ref 0–1.5)
BILIRUB SERPL-MCNC: 0.2 MG/DL (ref 0–1.2)
BUN SERPL-MCNC: 9 MG/DL (ref 6–20)
BUN/CREAT SERPL: 9.5 (ref 7–25)
CALCIUM SPEC-SCNC: 9.3 MG/DL (ref 8.6–10.5)
CHLORIDE SERPL-SCNC: 106 MMOL/L (ref 98–107)
CHOLEST SERPL-MCNC: 231 MG/DL (ref 0–200)
CO2 SERPL-SCNC: 26.8 MMOL/L (ref 22–29)
CREAT SERPL-MCNC: 0.95 MG/DL (ref 0.76–1.27)
DEPRECATED RDW RBC AUTO: 41 FL (ref 37–54)
EGFRCR SERPLBLD CKD-EPI 2021: 103.1 ML/MIN/1.73
EOSINOPHIL # BLD AUTO: 0.12 10*3/MM3 (ref 0–0.4)
EOSINOPHIL NFR BLD AUTO: 1.8 % (ref 0.3–6.2)
ERYTHROCYTE [DISTWIDTH] IN BLOOD BY AUTOMATED COUNT: 12.6 % (ref 12.3–15.4)
GLOBULIN UR ELPH-MCNC: 3 GM/DL
GLUCOSE SERPL-MCNC: 87 MG/DL (ref 65–99)
HCT VFR BLD AUTO: 44.4 % (ref 37.5–51)
HDLC SERPL-MCNC: 36 MG/DL (ref 40–60)
HGB BLD-MCNC: 14.9 G/DL (ref 13–17.7)
IMM GRANULOCYTES # BLD AUTO: 0.02 10*3/MM3 (ref 0–0.05)
IMM GRANULOCYTES NFR BLD AUTO: 0.3 % (ref 0–0.5)
LDLC SERPL CALC-MCNC: 170 MG/DL (ref 0–100)
LDLC/HDLC SERPL: 4.67 {RATIO}
LYMPHOCYTES # BLD AUTO: 2.04 10*3/MM3 (ref 0.7–3.1)
LYMPHOCYTES NFR BLD AUTO: 31.1 % (ref 19.6–45.3)
MCH RBC QN AUTO: 30.4 PG (ref 26.6–33)
MCHC RBC AUTO-ENTMCNC: 33.6 G/DL (ref 31.5–35.7)
MCV RBC AUTO: 90.6 FL (ref 79–97)
MONOCYTES # BLD AUTO: 0.41 10*3/MM3 (ref 0.1–0.9)
MONOCYTES NFR BLD AUTO: 6.2 % (ref 5–12)
NEUTROPHILS NFR BLD AUTO: 3.95 10*3/MM3 (ref 1.7–7)
NEUTROPHILS NFR BLD AUTO: 60.1 % (ref 42.7–76)
NRBC BLD AUTO-RTO: 0 /100 WBC (ref 0–0.2)
PLATELET # BLD AUTO: 201 10*3/MM3 (ref 140–450)
PMV BLD AUTO: 10.7 FL (ref 6–12)
POTASSIUM SERPL-SCNC: 4.8 MMOL/L (ref 3.5–5.2)
PROT SERPL-MCNC: 7.1 G/DL (ref 6–8.5)
RBC # BLD AUTO: 4.9 10*6/MM3 (ref 4.14–5.8)
SODIUM SERPL-SCNC: 142 MMOL/L (ref 136–145)
TRIGL SERPL-MCNC: 134 MG/DL (ref 0–150)
TSH SERPL DL<=0.05 MIU/L-ACNC: 1.44 UIU/ML (ref 0.27–4.2)
URATE SERPL-MCNC: 7.7 MG/DL (ref 3.4–7)
VLDLC SERPL-MCNC: 25 MG/DL (ref 5–40)
WBC NRBC COR # BLD AUTO: 6.57 10*3/MM3 (ref 3.4–10.8)

## 2024-04-24 NOTE — PROGRESS NOTES
Patient notified via voicemail and advised to give us a call back regarding this information on 4/24 @ 12:57PM.

## 2024-04-25 ENCOUNTER — OFFICE VISIT (OUTPATIENT)
Dept: CARDIOLOGY | Facility: CLINIC | Age: 39
End: 2024-04-25
Payer: COMMERCIAL

## 2024-04-25 ENCOUNTER — PATIENT ROUNDING (BHMG ONLY) (OUTPATIENT)
Dept: CARDIOLOGY | Facility: CLINIC | Age: 39
End: 2024-04-25

## 2024-04-25 VITALS
OXYGEN SATURATION: 97 % | HEART RATE: 58 BPM | DIASTOLIC BLOOD PRESSURE: 76 MMHG | WEIGHT: 253 LBS | HEIGHT: 71 IN | SYSTOLIC BLOOD PRESSURE: 131 MMHG | BODY MASS INDEX: 35.42 KG/M2

## 2024-04-25 DIAGNOSIS — G89.29 CHRONIC CHEST PAIN WITH LOW TO MODERATE RISK FOR CAD: Primary | ICD-10-CM

## 2024-04-25 DIAGNOSIS — E66.01 SEVERE OBESITY (BMI 35.0-39.9) WITH COMORBIDITY: ICD-10-CM

## 2024-04-25 DIAGNOSIS — R07.9 CHRONIC CHEST PAIN WITH LOW TO MODERATE RISK FOR CAD: Primary | ICD-10-CM

## 2024-04-25 DIAGNOSIS — Z91.89 CHRONIC CHEST PAIN WITH LOW TO MODERATE RISK FOR CAD: Primary | ICD-10-CM

## 2024-04-25 DIAGNOSIS — E78.5 HYPERLIPIDEMIA LDL GOAL <100: ICD-10-CM

## 2024-04-25 RX ORDER — ATORVASTATIN CALCIUM 40 MG/1
40 TABLET, FILM COATED ORAL DAILY
Qty: 90 TABLET | Refills: 3 | Status: SHIPPED | OUTPATIENT
Start: 2024-04-25

## 2024-04-25 NOTE — PROGRESS NOTES
Encounter Date:04/25/2024      Patient ID: Paul Vaughn is a 39 y.o. male.    Chief Complaint   Patient presents with    Chest Pain     Referral by PCP for CP          History of Present Illness  39-year-old with history of hyperlipidemia and elevated triglycerides comes in as a new patient consult for chest pain.  He said he has been having episodic chest pain which he describes as a tightness that occurs in his chest randomly.  It has been increasing in intensity but not frequency.  Denies any neck or jaw pain with it.  No diaphoresis.  No numbness or tingling in his arms.  He cannot pinpoint an exacerbating or relieving factor.  It last for few seconds.  His family history is unknown.  We reviewed his lipid panel which is detailed below in clinic today.  Lipid Panel          4/23/2024    13:44   Lipid Panel   Total Cholesterol 231    Triglycerides 134    HDL Cholesterol 36    VLDL Cholesterol 25    LDL Cholesterol  170    LDL/HDL Ratio 4.67        The following portions of the patient's history were reviewed and updated as appropriate: allergies, current medications, past family history, past medical history, past social history, past surgical history, and problem list.    Review of Systems   Constitutional: Positive for malaise/fatigue.   Cardiovascular:  Positive for chest pain and leg swelling. Negative for dyspnea on exertion and palpitations.   Respiratory:  Negative for cough and shortness of breath.    Gastrointestinal:  Negative for abdominal pain, nausea and vomiting.   Neurological:  Negative for dizziness, focal weakness, headaches, light-headedness and numbness.   All other systems reviewed and are negative.        Current Outpatient Medications:     allopurinol (ZYLOPRIM) 300 MG tablet, TAKE 1 TABLET BY MOUTH EVERY DAY, Disp: 90 tablet, Rfl: 3    escitalopram (LEXAPRO) 10 MG tablet, Take 1 tablet by mouth Daily., Disp: , Rfl:     atorvastatin (LIPITOR) 40 MG tablet, Take 1 tablet by mouth  "Daily., Disp: 90 tablet, Rfl: 3    naproxen (NAPROSYN) 500 MG tablet, TAKE 1 TABLET BY MOUTH TWICE A DAY WITH MEALS (Patient taking differently: Take  by mouth Daily As Needed.), Disp: 180 tablet, Rfl: 0    Allergies   Allergen Reactions    Codeine Other (See Comments)     Pt told as a child    Coconut Other (See Comments)       Family History   Problem Relation Age of Onset    Mental illness Mother         Psychiatric care.    Depression Mother     Allergies Other     Gout Other     Alcohol abuse Other     Arthritis Other        Past Surgical History:   Procedure Laterality Date    VASECTOMY      WISDOM TOOTH EXTRACTION         Past Medical History:   Diagnosis Date    Allergic     Anxiety     Arthritis     Depression     Gout     Insomnia     Pneumonia        Social History     Socioeconomic History    Marital status:    Tobacco Use    Smoking status: Former     Current packs/day: 0.00     Types: Cigarettes     Quit date:      Years since quittin.3    Smokeless tobacco: Never   Vaping Use    Vaping status: Never Used   Substance and Sexual Activity    Alcohol use: No    Drug use: Defer    Sexual activity: Defer           ECG 12 Lead    Date/Time: 2024 3:47 PM  Performed by: Rhina Mendoza MD    Authorized by: Rhina Mendoza MD  Comparison: not compared with previous ECG   Previous ECG: no previous ECG available  Rhythm: sinus rhythm  Rate: normal  BPM: 58  Conduction: conduction normal  QRS axis: normal    Clinical impression: normal ECG            Objective:       Physical Exam    /76 (BP Location: Left arm, Patient Position: Sitting)   Pulse 58   Ht 179.1 cm (70.5\")   Wt 115 kg (253 lb)   SpO2 97%   BMI 35.79 kg/m²   The patient is alert, oriented and in no distress.    Vital signs as noted above.    Head and neck revealed no carotid bruits or jugular venous distension.  No thyromegaly or lymphadenopathy is present.    Lungs clear.  No wheezing.  Breath sounds are normal " bilaterally.    Heart normal first and second heart sounds.  No murmur..  No pericardial rub is present.  No gallop is present.    Abdomen soft and nontender.  No organomegaly is present.    Extremities revealed good peripheral pulses without any pedal edema.    Skin warm and dry.    Musculoskeletal system is grossly normal.    CNS grossly normal.           Diagnosis Plan   1. Chronic chest pain with low to moderate risk for CAD  Stress Test With Myocardial Perfusion One Day    ECG 12 Lead      2. Hyperlipidemia LDL goal <100        3. Severe obesity (BMI 35.0-39.9) with comorbidity        LAB RESULTS (LAST 7 DAYS)    CBC  Results from last 7 days   Lab Units 04/23/24  1344   WBC 10*3/mm3 6.57   RBC 10*6/mm3 4.90   HEMOGLOBIN g/dL 14.9   HEMATOCRIT % 44.4   MCV fL 90.6   PLATELETS 10*3/mm3 201       BMP  Results from last 7 days   Lab Units 04/23/24  1344   SODIUM mmol/L 142   POTASSIUM mmol/L 4.8   CHLORIDE mmol/L 106   CO2 mmol/L 26.8   BUN mg/dL 9   CREATININE mg/dL 0.95   GLUCOSE mg/dL 87       CMP   Results from last 7 days   Lab Units 04/23/24  1344   SODIUM mmol/L 142   POTASSIUM mmol/L 4.8   CHLORIDE mmol/L 106   CO2 mmol/L 26.8   BUN mg/dL 9   CREATININE mg/dL 0.95   GLUCOSE mg/dL 87   ALBUMIN g/dL 4.1   BILIRUBIN mg/dL 0.2   ALK PHOS U/L 77   AST (SGOT) U/L 22   ALT (SGPT) U/L 30         BNP        TROPONIN        CoAg        Creatinine Clearance  Estimated Creatinine Clearance: 131 mL/min (by C-G formula based on SCr of 0.95 mg/dL).    ABG        Radiology  No radiology results for the last day         Assessment and Plan       Diagnoses and all orders for this visit:    1. Chronic chest pain with low to moderate risk for CAD (Primary)  -     Stress Test With Myocardial Perfusion One Day; Future  -     ECG 12 Lead    2. Hyperlipidemia LDL goal <100    3. Severe obesity (BMI 35.0-39.9) with comorbidity    Other orders  -     atorvastatin (LIPITOR) 40 MG tablet; Take 1 tablet by mouth Daily.  Dispense: 90  tablet; Refill: 3       Chest pain  Has some typical features and risk factors include age and hyperlipidemia  Family history is unknown  I would like to proceed with a stress test    Hyperlipidemia  Given his elevated LDL more than 150 he would benefit from high intensity statin  Start atorvastatin 40 mg    Obesity  We discussed a heart healthy diet and exercise  Lives a pretty sedentary lifestyle.  Also mentions that he is under a lot of stress at home      Rhina Mendoza MD

## 2024-04-25 NOTE — PROGRESS NOTES
A My-Chart message has been sent to the patient for PATIENT ROUNDING with [facility]

## 2024-05-23 ENCOUNTER — HOSPITAL ENCOUNTER (OUTPATIENT)
Dept: CARDIOLOGY | Facility: HOSPITAL | Age: 39
Discharge: HOME | End: 2024-05-23
Payer: COMMERCIAL

## 2024-05-23 ENCOUNTER — OFFICE VISIT (OUTPATIENT)
Dept: SLEEP MEDICINE | Facility: CLINIC | Age: 39
End: 2024-05-23
Payer: COMMERCIAL

## 2024-05-23 VITALS
OXYGEN SATURATION: 98 % | SYSTOLIC BLOOD PRESSURE: 139 MMHG | BODY MASS INDEX: 35.7 KG/M2 | HEART RATE: 59 BPM | WEIGHT: 255 LBS | DIASTOLIC BLOOD PRESSURE: 86 MMHG | RESPIRATION RATE: 16 BRPM | HEIGHT: 71 IN

## 2024-05-23 DIAGNOSIS — Z91.89 CHRONIC CHEST PAIN WITH LOW TO MODERATE RISK FOR CAD: ICD-10-CM

## 2024-05-23 DIAGNOSIS — R07.9 CHRONIC CHEST PAIN WITH LOW TO MODERATE RISK FOR CAD: ICD-10-CM

## 2024-05-23 DIAGNOSIS — G89.29 CHRONIC CHEST PAIN WITH LOW TO MODERATE RISK FOR CAD: ICD-10-CM

## 2024-05-23 DIAGNOSIS — E66.9 CLASS 2 OBESITY WITH BODY MASS INDEX (BMI) OF 35.0 TO 35.9 IN ADULT, UNSPECIFIED OBESITY TYPE, UNSPECIFIED WHETHER SERIOUS COMORBIDITY PRESENT: ICD-10-CM

## 2024-05-23 DIAGNOSIS — R06.81 WITNESSED EPISODE OF APNEA: Primary | ICD-10-CM

## 2024-05-23 LAB
BH CV REST NUCLEAR ISOTOPE DOSE: 10.4 MCI
BH CV STRESS COMMENTS STAGE 1: NORMAL
BH CV STRESS DOSE REGADENOSON STAGE 1: 0.4
BH CV STRESS DURATION MIN STAGE 1: 0
BH CV STRESS DURATION SEC STAGE 1: 10
BH CV STRESS NUCLEAR ISOTOPE DOSE: 33.4 MCI
BH CV STRESS PROTOCOL 1: NORMAL
BH CV STRESS RECOVERY BP: NORMAL MMHG
BH CV STRESS RECOVERY HR: 97 BPM
BH CV STRESS STAGE 1: 1
LV EF NUC BP: 55 %
MAXIMAL PREDICTED HEART RATE: 179 BPM
STRESS BASELINE BP: NORMAL MMHG
STRESS BASELINE HR: 66 BPM
STRESS TARGET HR: 152 BPM

## 2024-05-23 PROCEDURE — 93017 CV STRESS TEST TRACING ONLY: CPT

## 2024-05-23 PROCEDURE — 78452 HT MUSCLE IMAGE SPECT MULT: CPT

## 2024-05-23 PROCEDURE — 25010000002 REGADENOSON 0.4 MG/5ML SOLUTION: Performed by: INTERNAL MEDICINE

## 2024-05-23 PROCEDURE — G0463 HOSPITAL OUTPT CLINIC VISIT: HCPCS

## 2024-05-23 PROCEDURE — A9500 TC99M SESTAMIBI: HCPCS | Performed by: INTERNAL MEDICINE

## 2024-05-23 PROCEDURE — 0 TECHNETIUM SESTAMIBI: Performed by: INTERNAL MEDICINE

## 2024-05-23 RX ORDER — REGADENOSON 0.08 MG/ML
0.4 INJECTION, SOLUTION INTRAVENOUS
Status: COMPLETED | OUTPATIENT
Start: 2024-05-23 | End: 2024-05-23

## 2024-05-23 RX ADMIN — REGADENOSON 0.4 MG: 0.08 INJECTION, SOLUTION INTRAVENOUS at 10:08

## 2024-05-23 RX ADMIN — TECHNETIUM TC 99M SESTAMIBI 1 DOSE: 1 INJECTION INTRAVENOUS at 10:08

## 2024-05-23 RX ADMIN — TECHNETIUM TC 99M SESTAMIBI 1 DOSE: 1 INJECTION INTRAVENOUS at 08:24

## 2024-05-23 NOTE — PROGRESS NOTES
[facility]  [address] [place] [id number]  Phone   Fax       Paul Vaughn  [id number]   1985  39 y.o.  male      Referring Provider: Rhina Mendoza MD  PCP: Jamia Akien MD    Type of service: Initial Sleep Medicine Consult  Date of service: 5/23/2024          CHIEF COMPLAINT: Witnessed apnea      HISTORY OF PRESENT ILLNESS:  Thank you for asking us to see Paul Vaughn.  Paul Vaughn 39 y.o. was seen today on 5/23/2024 at [facility].  Patient presents today with symptoms of snoring, non-restorative sleep, and witnessed apneas.  The symptoms are chronic in nature.  Patient has no history of tonsillectomy, adenoidectomy, nasal surgery, UPPP.  His wife is with him in the office today, per patient preference.  She reports significant snoring and witnessed apnea.  Patient has trouble staying asleep at night.  Wakes up still tired.  Naps during the day.      SLEEP HISTORY:  Sleep schedule:  Bedtime: 1 to 3 AM  Wake time: 830-11 a.m.  Time it takes to fall asleep: 30 to 60 minutes  Average hours of sleep: 6  Number of naps per day: 1-2    Symptoms:   In addition to the above, patient reports the following associated symptoms:  Have you ever awakened gasping for breath, coughing, choking: No   Change in weight:  Yes, lost 50 pounds  Morning headaches:  No   Awaken with a sore throat or dry mouth:  Yes   Leg jerking at night:  No   Creepy crawly feeling in legs/urge to move legs: No   Teeth grinding: No   Have you ever awakened at night with a sour taste or burning sensation in your chest:  No   Do you have muscle weakness with laughing or anger:  No   Have you ever felt paralyzed while going to sleep or waking up:  No   Sleepwalking: No   Nightmares: Yes   Nocturia (urination at night): 0 times per night  Memory Problem: Yes     Medical Conditions (PMH):   Poor concentration  Poor memory  Depression  Arthritis    Social  "history:  Do you drive a commercial vehicle:  No   Shift work:  No   Tobacco use: Former  Alcohol use: 1 per week  Caffeinated drinks: 6 to 8   12 ounce sodas per day per day  Occupation:     Family History (parents and siblings) (pertaining to sleep medicine):  COPD  Thyroid disorder    Medications: reviewed    Allergies:  Codeine and Coconut      REVIEW OF SYSTEMS:  Pertinent positive symptoms are:  Snoring  Witnessed apnea  [place] Sleepiness Scale of Total score: 12   Fatigue  Joint pain  Anxiety and depression  History of chest pain--- being evaluated by cardiology, had stress test earlier today      PHYSICAL EXAM:  CONSTITUTINONAL:   Vitals:    05/23/24 1300   BP: 139/86   BP Location: Left arm   Patient Position: Sitting   Pulse: 59   Resp: 16   SpO2: 98%   Weight: 116 kg (255 lb)   Height: 180.3 cm (71\")    Body mass index is 35.57 kg/m².   HEAD: atraumatic, normocephalic   THROAT: Mallampati class III-IV  NECK: Neck Circumference: 16 inches, trachea is midline  RESPIRATORY SYSTEM: Respirations even, unlabored, normal rate  CARDIOVASULAR SYSTEM: Normal rate, no edema   NEUROLOGICAL SYSTEM: Alert and oriented x 3  PSYCHIATRIC SYSTEM: Mood is normal/ appropriate     Office note(s) from care team reviewed. Office note(s) reviewed: 4/25/2024 cardiology note    Labs/ Test Results Reviewed:  TSH          4/23/2024    13:44   TSH   TSH 1.440        5/2024 stress test          ASSESSMENT AND PLAN:   Witnessed apnea: patient's symptoms and physical examination are concerning for possible sleep apnea.   I discussed the signs, symptoms, and pathophysiology of sleep apnea with this patient.  I also discussed the possible complications of untreated sleep apnea including but not limited to potential risk of resistant hypertension, insulin resistance, pulmonary hypertension, atrial fibrillation or other arrhythmias, heart attack, stroke, nonrestorative sleep with hypersomnia which can increase risk for " motor vehicle accidents, etc.   Different testing methods including home-based and lab based sleep studies were discussed with this patient.   Based on patient history and physical examination, will proceed with home sleep study.  The order for the sleep study is placed in [facility].  The test will be scheduled after prior authorization has been obtained through patient's insurance.  Discussed overview of treatment options for sleep apnea in the office today including PAP therapy, and treatment/ management will be discussed in more detail with this patient after the test is completed.  All questions were answered to patient's satisfaction.   Snoring: snoring is the sound created by turbulent airflow vibrating upper airway soft tissue.  I have also discussed factors affecting snoring including sleep deprivation, sleeping on the back and alcohol ingestion. To minimize snoring, patient is advised to have adequate sleep, sleep on their side, and avoid alcohol and sedative medications around bedtime.   Excessive daytime sleepiness:  [place] Sleepiness Scale of Total score: 12.  There are many causes of daytime sleepiness and/or fatigue.  Rule out sleep apnea as a contributing factor, as above.  Do not drive, operate heavy machinery, or do activities that require high concentration if feeling tired/drowsy.  Obesity: Body mass index is 35.57 kg/m².. Patients who are overweight or obese are at increased risk of sleep apnea/ sleep disordered breathing. Weight reduction and healthy lifestyle are encouraged in overweight/ obese patients as part of a comprehensive approach to sleep apnea treatment.    Anxiety and depression  History of chest pain: Being evaluated by cardiology.  Had stress test today.  No symptoms during office visit today.  Excessive caffeine intake: Recommended weaning back on this slowly    I have also discussed with the patient the following  Sleep hygiene: try to maintain a regular bed time and wake time, avoid  watching TV/ using electronic devices in bed (including cell phones), limit caffeinated and alcoholic beverages before bed, try to maintain a cool and quiet sleep environment, avoid daytime naps.  Handout provided.  Adequate amount of sleep: most people need around 7 to 9 hours of sleep each night        Patient will follow-up after study, 31 to 90 days after PAP therapy initiated if applicable, or contact the office sooner for questions or concerns. Patient's questions were answered.            Thank you again for asking me to consult on this patient.  Please do not hesitate to call me if you have additional questions or concerns.       Briseyda Salgado DNP, APRN  [facility]

## 2024-06-19 ENCOUNTER — HOSPITAL ENCOUNTER (OUTPATIENT)
Dept: SLEEP MEDICINE | Facility: HOSPITAL | Age: 39
Discharge: HOME | End: 2024-06-19
Admitting: NURSE PRACTITIONER
Payer: COMMERCIAL

## 2024-06-19 DIAGNOSIS — R06.81 WITNESSED EPISODE OF APNEA: ICD-10-CM

## 2024-06-19 DIAGNOSIS — E66.9 CLASS 2 OBESITY WITH BODY MASS INDEX (BMI) OF 35.0 TO 35.9 IN ADULT, UNSPECIFIED OBESITY TYPE, UNSPECIFIED WHETHER SERIOUS COMORBIDITY PRESENT: ICD-10-CM

## 2024-06-19 PROCEDURE — 95806 SLEEP STUDY UNATT&RESP EFFT: CPT

## 2024-06-24 DIAGNOSIS — R06.81 WITNESSED EPISODE OF APNEA: Primary | ICD-10-CM

## 2024-06-24 DIAGNOSIS — E66.9 CLASS 2 OBESITY WITH BODY MASS INDEX (BMI) OF 35.0 TO 35.9 IN ADULT, UNSPECIFIED OBESITY TYPE, UNSPECIFIED WHETHER SERIOUS COMORBIDITY PRESENT: ICD-10-CM

## 2024-06-24 DIAGNOSIS — R06.83 SNORING: ICD-10-CM

## 2024-06-24 PROCEDURE — 95806 SLEEP STUDY UNATT&RESP EFFT: CPT | Performed by: INTERNAL MEDICINE

## 2024-07-01 ENCOUNTER — PATIENT MESSAGE (OUTPATIENT)
Dept: PULMONOLOGY | Facility: HOSPITAL | Age: 39
End: 2024-07-01
Payer: COMMERCIAL

## 2024-07-12 ENCOUNTER — HOSPITAL ENCOUNTER (OUTPATIENT)
Dept: SLEEP MEDICINE | Facility: HOSPITAL | Age: 39
End: 2024-07-12
Payer: COMMERCIAL

## 2024-07-12 VITALS — WEIGHT: 255.73 LBS | BODY MASS INDEX: 35.8 KG/M2 | HEIGHT: 71 IN

## 2024-07-12 DIAGNOSIS — R06.81 WITNESSED EPISODE OF APNEA: ICD-10-CM

## 2024-07-12 DIAGNOSIS — R06.83 SNORING: ICD-10-CM

## 2024-07-12 DIAGNOSIS — E66.9 CLASS 2 OBESITY WITH BODY MASS INDEX (BMI) OF 35.0 TO 35.9 IN ADULT, UNSPECIFIED OBESITY TYPE, UNSPECIFIED WHETHER SERIOUS COMORBIDITY PRESENT: ICD-10-CM

## 2024-07-12 PROCEDURE — 95810 POLYSOM 6/> YRS 4/> PARAM: CPT

## 2024-07-23 ENCOUNTER — OFFICE VISIT (OUTPATIENT)
Dept: FAMILY MEDICINE CLINIC | Facility: CLINIC | Age: 39
End: 2024-07-23
Payer: COMMERCIAL

## 2024-07-23 ENCOUNTER — LAB (OUTPATIENT)
Dept: FAMILY MEDICINE CLINIC | Facility: CLINIC | Age: 39
End: 2024-07-23
Payer: COMMERCIAL

## 2024-07-23 VITALS
TEMPERATURE: 98 F | SYSTOLIC BLOOD PRESSURE: 118 MMHG | HEART RATE: 52 BPM | HEIGHT: 71 IN | WEIGHT: 245.8 LBS | OXYGEN SATURATION: 98 % | BODY MASS INDEX: 34.41 KG/M2 | DIASTOLIC BLOOD PRESSURE: 78 MMHG

## 2024-07-23 DIAGNOSIS — Z11.59 NEED FOR HEPATITIS C SCREENING TEST: ICD-10-CM

## 2024-07-23 DIAGNOSIS — M79.645 CHRONIC PAIN OF LEFT THUMB: ICD-10-CM

## 2024-07-23 DIAGNOSIS — G89.29 CHRONIC PAIN OF LEFT THUMB: ICD-10-CM

## 2024-07-23 DIAGNOSIS — Z00.00 WELLNESS EXAMINATION: Primary | ICD-10-CM

## 2024-07-23 DIAGNOSIS — F33.1 MODERATE EPISODE OF RECURRENT MAJOR DEPRESSIVE DISORDER: ICD-10-CM

## 2024-07-23 DIAGNOSIS — R06.83 SNORING: ICD-10-CM

## 2024-07-23 DIAGNOSIS — G47.33 OSA (OBSTRUCTIVE SLEEP APNEA): Primary | ICD-10-CM

## 2024-07-23 DIAGNOSIS — Z00.00 WELLNESS EXAMINATION: ICD-10-CM

## 2024-07-23 PROCEDURE — 80053 COMPREHEN METABOLIC PANEL: CPT | Performed by: FAMILY MEDICINE

## 2024-07-23 PROCEDURE — 36415 COLL VENOUS BLD VENIPUNCTURE: CPT | Performed by: FAMILY MEDICINE

## 2024-07-23 PROCEDURE — 80061 LIPID PANEL: CPT | Performed by: FAMILY MEDICINE

## 2024-07-23 PROCEDURE — 84403 ASSAY OF TOTAL TESTOSTERONE: CPT | Performed by: FAMILY MEDICINE

## 2024-07-23 PROCEDURE — 86803 HEPATITIS C AB TEST: CPT | Performed by: FAMILY MEDICINE

## 2024-07-23 PROCEDURE — 99396 PREV VISIT EST AGE 40-64: CPT | Performed by: FAMILY MEDICINE

## 2024-07-23 RX ORDER — ALLOPURINOL 300 MG/1
TABLET ORAL
Qty: 90 TABLET | Refills: 3 | Status: SHIPPED | OUTPATIENT
Start: 2024-07-23

## 2024-07-23 RX ORDER — ESCITALOPRAM OXALATE 10 MG/1
10 TABLET ORAL DAILY
Qty: 90 TABLET | Refills: 1 | Status: SHIPPED | OUTPATIENT
Start: 2024-07-23

## 2024-07-23 NOTE — PROGRESS NOTES
"Subjective   Paul Vaughn is a 39 y.o. male and is here for a comprehensive physical exam. The patient reports problems - left hand tingling and pain at the base of his thumb.  He works on a keyboard at work all day.  .He has noticed more symptoms when he works all day and then tries to play video games at home.     Do you take any herbs or supplements that were not prescribed by a doctor? no  Are you taking calcium supplements? no  Are you taking aspirin daily? no    The following portions of the patient's history were reviewed and updated as appropriate: allergies, current medications, past family history, past medical history, past social history, past surgical history, and problem list.    Review of Systems  Do you have pain that bothers you in your daily life? yes  Pertinent items are noted in HPI.    Objective   /78 (BP Location: Left arm, Patient Position: Sitting, Cuff Size: Large Adult)   Pulse 52   Temp 98 °F (36.7 °C) (Infrared)   Ht 180.3 cm (70.98\")   Wt 111 kg (245 lb 12.8 oz)   SpO2 98%   BMI 34.30 kg/m²   General appearance: alert, appears stated age, and cooperative  Head: Normocephalic, without obvious abnormality, atraumatic  Eyes: conjunctivae/corneas clear. PERRL, EOM's intact. Fundi benign.  Ears: normal TM's and external ear canals both ears  Throat: lips, mucosa, and tongue normal; teeth and gums normal  Neck: no adenopathy, no JVD, supple, symmetrical, trachea midline, and thyroid not enlarged, symmetric, no tenderness/mass/nodules  Lungs: clear to auscultation bilaterally  Heart: regular rate and rhythm, S1, S2 normal, no murmur, click, rub or gallop  Extremities: extremities normal, atraumatic, no cyanosis or edema  Pulses: 2+ and symmetric  Skin: Skin color, texture, turgor normal. No rashes or lesions  Lymph nodes: Cervical, supraclavicular, and axillary nodes normal.  Neurologic: Grossly normal     Office Visit on 07/23/2024   Component Date Value Ref Range Status    " Total Cholesterol 07/23/2024 161  0 - 200 mg/dL Final    Triglycerides 07/23/2024 143  0 - 150 mg/dL Final    HDL Cholesterol 07/23/2024 36 (L)  40 - 60 mg/dL Final    LDL Cholesterol  07/23/2024 100  0 - 100 mg/dL Final    VLDL Cholesterol 07/23/2024 25  5 - 40 mg/dL Final    LDL/HDL Ratio 07/23/2024 2.68   Final    Glucose 07/23/2024 85  65 - 99 mg/dL Final    BUN 07/23/2024 11  6 - 20 mg/dL Final    Creatinine 07/23/2024 1.02  0.76 - 1.27 mg/dL Final    Sodium 07/23/2024 141  136 - 145 mmol/L Final    Potassium 07/23/2024 4.2  3.5 - 5.2 mmol/L Final    Chloride 07/23/2024 104  98 - 107 mmol/L Final    CO2 07/23/2024 26.9  22.0 - 29.0 mmol/L Final    Calcium 07/23/2024 9.1  8.6 - 10.5 mg/dL Final    Total Protein 07/23/2024 7.1  6.0 - 8.5 g/dL Final    Albumin 07/23/2024 4.0  3.5 - 5.2 g/dL Final    ALT (SGPT) 07/23/2024 24  1 - 41 U/L Final    AST (SGOT) 07/23/2024 20  1 - 40 U/L Final    Alkaline Phosphatase 07/23/2024 91  39 - 117 U/L Final    Total Bilirubin 07/23/2024 0.4  0.0 - 1.2 mg/dL Final    Globulin 07/23/2024 3.1  gm/dL Final    A/G Ratio 07/23/2024 1.3  g/dL Final    BUN/Creatinine Ratio 07/23/2024 10.8  7.0 - 25.0 Final    Anion Gap 07/23/2024 10.1  5.0 - 15.0 mmol/L Final    eGFR 07/23/2024 94.7  >60.0 mL/min/1.73 Final   Lab on 07/23/2024   Component Date Value Ref Range Status    Testosterone, Total 07/23/2024 609.00  249.00 - 836.00 ng/dL Final    Hepatitis C Ab 07/23/2024 Non-Reactive  Non-Reactive Final       Assessment & Plan   Healthy male exam.   Diagnoses and all orders for this visit:    1. Wellness examination (Primary)  -     Lipid Panel  -     Comprehensive Metabolic Panel  -     Testosterone; Future    2. Chronic pain of left thumb  -     Ambulatory Referral to Hand Surgery    3. Need for hepatitis C screening test  -     Hepatitis C Antibody; Future      1. Well exam.   2. Patient Counseling:  --Nutrition: Stressed importance of moderation in sodium/caffeine intake, saturated fat and  cholesterol, caloric balance, sufficient intake of fresh fruits, vegetables, fiber, calcium, iron  --Exercise: Stressed the importance of regular exercise.    --Dental health: Discussed importance of regular tooth brushing, flossing, and dental visits.  --Immunizations reviewed.  --Discussed benefits of screening colonoscopy.    3. Discussed the patient's BMI with him.  The BMI is above average; BMI management plan is completed  4. Follow up in one year

## 2024-07-24 LAB
ALBUMIN SERPL-MCNC: 4 G/DL (ref 3.5–5.2)
ALBUMIN/GLOB SERPL: 1.3 G/DL
ALP SERPL-CCNC: 91 U/L (ref 39–117)
ALT SERPL W P-5'-P-CCNC: 24 U/L (ref 1–41)
ANION GAP SERPL CALCULATED.3IONS-SCNC: 10.1 MMOL/L (ref 5–15)
AST SERPL-CCNC: 20 U/L (ref 1–40)
BILIRUB SERPL-MCNC: 0.4 MG/DL (ref 0–1.2)
BUN SERPL-MCNC: 11 MG/DL (ref 6–20)
BUN/CREAT SERPL: 10.8 (ref 7–25)
CALCIUM SPEC-SCNC: 9.1 MG/DL (ref 8.6–10.5)
CHLORIDE SERPL-SCNC: 104 MMOL/L (ref 98–107)
CHOLEST SERPL-MCNC: 161 MG/DL (ref 0–200)
CO2 SERPL-SCNC: 26.9 MMOL/L (ref 22–29)
CREAT SERPL-MCNC: 1.02 MG/DL (ref 0.76–1.27)
EGFRCR SERPLBLD CKD-EPI 2021: 94.7 ML/MIN/1.73
GLOBULIN UR ELPH-MCNC: 3.1 GM/DL
GLUCOSE SERPL-MCNC: 85 MG/DL (ref 65–99)
HCV AB SER QL: NORMAL
HDLC SERPL-MCNC: 36 MG/DL (ref 40–60)
LDLC SERPL CALC-MCNC: 100 MG/DL (ref 0–100)
LDLC/HDLC SERPL: 2.68 {RATIO}
POTASSIUM SERPL-SCNC: 4.2 MMOL/L (ref 3.5–5.2)
PROT SERPL-MCNC: 7.1 G/DL (ref 6–8.5)
SODIUM SERPL-SCNC: 141 MMOL/L (ref 136–145)
TESTOST SERPL-MCNC: 609 NG/DL (ref 249–836)
TRIGL SERPL-MCNC: 143 MG/DL (ref 0–150)
VLDLC SERPL-MCNC: 25 MG/DL (ref 5–40)

## 2024-10-10 ENCOUNTER — OFFICE VISIT (OUTPATIENT)
Dept: SLEEP MEDICINE | Facility: CLINIC | Age: 39
End: 2024-10-10
Payer: COMMERCIAL

## 2024-10-10 VITALS
DIASTOLIC BLOOD PRESSURE: 72 MMHG | SYSTOLIC BLOOD PRESSURE: 128 MMHG | HEIGHT: 71 IN | HEART RATE: 67 BPM | BODY MASS INDEX: 36.12 KG/M2 | OXYGEN SATURATION: 96 % | WEIGHT: 258 LBS

## 2024-10-10 DIAGNOSIS — G47.33 MODERATE OBSTRUCTIVE SLEEP APNEA: Primary | ICD-10-CM

## 2024-10-10 DIAGNOSIS — E66.01 CLASS 2 SEVERE OBESITY WITH SERIOUS COMORBIDITY AND BODY MASS INDEX (BMI) OF 35.0 TO 35.9 IN ADULT, UNSPECIFIED OBESITY TYPE: ICD-10-CM

## 2024-10-10 DIAGNOSIS — E66.812 CLASS 2 SEVERE OBESITY WITH SERIOUS COMORBIDITY AND BODY MASS INDEX (BMI) OF 35.0 TO 35.9 IN ADULT, UNSPECIFIED OBESITY TYPE: ICD-10-CM

## 2024-10-10 PROCEDURE — G0463 HOSPITAL OUTPT CLINIC VISIT: HCPCS

## 2024-10-10 NOTE — PROGRESS NOTES
[address] [address]  Phone   Fax         SLEEP CLINIC FOLLOW-UP PROGRESS NOTE    Paul Vaughn  [id number]   1985  39 y.o.  male      PCP: Jamia Aiken MD    DATE OF VISIT: 10/10/2024          CHIEF COMPLAINT: Moderate obstructive sleep apnea    HPI:  This is a 39 y.o. year old patient who presents to the clinic today for the management of obstructive sleep apnea.  Patient had a(n) in-lab polysomnogram sleep study 7/2024 showing moderate obstructive sleep apnea with AHI of 18/hr using 4% desaturation for hypopneas.  Snoring was also noted.  Sleep efficiency was 81%.  This patient is using positive airway pressure therapy with auto CPAP at 8 to 16 cm H2O.   Patient's sleep apnea has improved with this therapy with residual AHI 1.8/hr.   Average usage is 4 hours and 46 nights per night on nights used.   His overall usage for the last 30 days is at about 60% with greater than 4-hour usage at about 50%.  He reports he really does tolerate the CPAP well, no issues with pressures, however he forgets to put on many nights or may fall asleep watching TV before he puts it on.  We discussed possibly going ahead and putting it on while watching TV or reading in case he falls asleep.  We also discussed possibly setting alarm on the phone as a reminder.  He is having a lot of mask leak.  He was told that this is because of his beard.  He is using a fullface mask that goes under the nose and over the mouth with the hose off the top of the head.  He would be willing to try a fullface mask that covers the bridge of the nose as well.  He does not feel he would likely tolerate a nasal pillow mask even with chinstrap given the degree of breathing through his mouth he does at night.          MEDICATIONS: reviewed     ALLERGIES:  Codeine and Coconut    SOCIAL HISTORY (habits pertaining to sleep medicine):  Tobacco use: No   Alcohol use: 0 per  "week  Caffeine use: 6     REVIEW OF SYSTEMS:   Pertinent positive symptoms are:  [place] Sleepiness Scale :Total score: 5   Anxiety and depression: Managed by outside provider.  On medications.        PHYSICAL EXAMINATION:  CONSTITUTIONAL:  Vitals:    10/10/24 1300   BP: 128/72   BP Location: Left arm   Patient Position: Sitting   Pulse: 67   SpO2: 96%   Weight: 117 kg (258 lb)   Height: 180.3 cm (71\")    Body mass index is 35.98 kg/m².   HEAD: atraumatic, normocephalic  RESP SYSTEM: not in respiratory distress, breathing unlabored  CARDIOVASULAR: normal rate, no edema noted   NEURO: Alert and oriented x 3, mood and affect appeared appropriate      DATA REVIEWED:  The 30-day PAP compliance summary downloaded on 10/1/2024 has been reviewed independently by me and discussed with the patient.   Compliance: 60%  More than 4 hr use: 50%  Average use of the device: 4 hours 46 minutes per night on days used  Residual AHI: 1.8 /hr (goal < 5.0 /hr)  Device: ResMed air sense 10  Mask type: Fullface mask, under nose, over mouth, hose off the top of the head, size medium  DME: [facility]          ASSESSMENT AND PLAN:  Obstructive Sleep Apnea: sleep apnea has improved with the device and treatment.   I have personally reviewed the smart card download and discussed the download data with the patient and encouarged continued use of the device.  The residual AHI is acceptable. The device is benefiting the patient and the device is medically necessary.  Recommend patient get supplies from the DME company, change them on a regular basis, and clean as directed.  A prescription for supplies has been sent to the [facility].  Recommend prioritizing sleep to aid in overall health.  Do not drive or operate heavy machinery or do activities that require high concentration if feeling tired or drowsy.  We discussed increasing usage.  We discussed addressing mask leak.  Given was fit for and given sample of F&P vitera size medium in " office.  Discussed need to increase usage for continued insurance coverage.  Recommend using at least 4 hours per night at least 70% the time to meet insurance criteria, though would recommend all night every night for optimum health.  We discussed possibly setting an alarm on his phone to help remember to put on.  He also discussed possible CPAP pillow.  May also benefit from memory foam fullface mask if cannot get good seal with this 1.  Severe Obesity w/ comorbidity: Body mass index is 35.98 kg/m².. Patients who are overweight or obese are at increased risk of sleep apnea/ sleep disordered breathing. Weight reduction and healthy lifestyle are encouraged in overweight/ obese patients as part of a comprehensive approach to sleep apnea treatment.        Patient will follow-up in 6 weeks or follow-up sooner for any issues or concerns.  Patient's questions were answered.          Thank you for allowing me to participate in the care of this patient.     Briseyda Salgado DNP, [facility] Sleep Medicine

## 2024-12-05 NOTE — PROGRESS NOTES
Encounter Date:12/10/2024        Patient ID: Paul Vaughn is a 39 y.o. male.      Chief Complaint:      History of Present Illness  39-year-old with history of hyperlipidemia and elevated triglycerides , chest pain who comes in for follow-up.  Previously had a nuclear stress test which was negative for ischemia.    Current cardiac medications include atorvastatin.      The following portions of the patient's history were reviewed and updated as appropriate: allergies, current medications, past family history, past medical history, past social history, past surgical history, and problem list.    Review of Systems   Constitutional: Negative for malaise/fatigue.   Cardiovascular:  Negative for chest pain, leg swelling and palpitations.   Respiratory:  Negative for shortness of breath.    Skin:  Negative for rash.   Neurological:  Negative for dizziness, light-headedness and numbness.         Current Outpatient Medications:     allopurinol (ZYLOPRIM) 300 MG tablet, TAKE 1 TABLET BY MOUTH EVERY DAY, Disp: 90 tablet, Rfl: 3    atorvastatin (LIPITOR) 40 MG tablet, Take 1 tablet by mouth Daily., Disp: 90 tablet, Rfl: 3    CHERRY PO, Take  by mouth., Disp: , Rfl:     escitalopram (LEXAPRO) 10 MG tablet, TAKE 1 TABLET BY MOUTH EVERY DAY, Disp: 90 tablet, Rfl: 1    lamoTRIgine (LaMICtal) 25 MG tablet, 2 tablets Daily. Please see attached for detailed directions, Disp: , Rfl:     naproxen (NAPROSYN) 500 MG tablet, TAKE 1 TABLET BY MOUTH TWICE A DAY WITH MEALS (Patient taking differently: Take  by mouth Daily As Needed.), Disp: 180 tablet, Rfl: 0    Current outpatient and discharge medications have been reconciled for the patient.  Reviewed by: Sánchez Garcia MD       Allergies   Allergen Reactions    Codeine Other (See Comments)     Pt told as a child    Coconut Other (See Comments)       Family History   Problem Relation Age of Onset    Mental illness Mother         Psychiatric care.    Depression Mother     COPD Mother   "   Emphysema Mother     Allergies Other     Gout Other     Alcohol abuse Other     Arthritis Other     Sleep apnea Neg Hx        Past Surgical History:   Procedure Laterality Date    VASECTOMY      WISDOM TOOTH EXTRACTION         Past Medical History:   Diagnosis Date    Allergic     Anxiety     Arthritis     Depression     Gout     Insomnia     Pneumonia 2016       Family History   Problem Relation Age of Onset    Mental illness Mother         Psychiatric care.    Depression Mother     COPD Mother     Emphysema Mother     Allergies Other     Gout Other     Alcohol abuse Other     Arthritis Other     Sleep apnea Neg Hx        Social History     Socioeconomic History    Marital status:    Tobacco Use    Smoking status: Former     Current packs/day: 0.00     Average packs/day: 2.0 packs/day for 20.0 years (40.0 ttl pk-yrs)     Types: Cigarettes     Quit date:      Years since quittin.9    Smokeless tobacco: Never   Vaping Use    Vaping status: Never Used   Substance and Sexual Activity    Alcohol use: No    Drug use: Defer    Sexual activity: Defer               Objective:       Physical Exam    /71   Pulse 52   Ht 180.3 cm (71\")   Wt 109 kg (240 lb)   SpO2 99%   BMI 33.47 kg/m²   The patient is alert, oriented and in no distress.    Vital signs as noted above.    Head and neck revealed no carotid bruits or jugular venous distension.  No thyromegaly or lymphadenopathy is present.    Lungs clear.  No wheezing.  Breath sounds are normal bilaterally.    Heart normal first and second heart sounds.  No murmur..  No pericardial rub is present.  No gallop is present.    Abdomen soft and nontender.  No organomegaly is present.    Extremities revealed good peripheral pulses without any pedal edema.    Skin warm and dry.    Musculoskeletal system is grossly normal.    CNS grossly normal.           Diagnosis Plan   1. Chronic chest pain with low to moderate risk for CAD        2. Hyperlipidemia LDL goal " <100        3. Severe obesity (BMI 35.0-39.9) with comorbidity        4. Chronic gout of multiple sites, unspecified cause        5. Essential hypertension        LAB RESULTS (LAST 7 DAYS)    CBC        BMP        CMP         BNP        TROPONIN        CoAg        Creatinine Clearance  CrCl cannot be calculated (Patient's most recent lab result is older than the maximum 30 days allowed.).    ABG        Radiology  No radiology results for the last day    EKG  Procedures    Stress test  Results for orders placed during the hospital encounter of 05/23/24    Stress Test With Myocardial Perfusion One Day    Interpretation Summary    Myocardial perfusion imaging indicates a normal myocardial perfusion study with no evidence of ischemia. Impressions are consistent with a low risk study.    Left ventricular ejection fraction is normal (Calculated EF = 55%).    Findings consistent with a normal ECG stress test.      Echocardiogram      Cardiac catheterization  No results found for this or any previous visit.          Assessment and Plan       Diagnoses and all orders for this visit:    1. Chronic chest pain with low to moderate risk for CAD (Primary)    2. Hyperlipidemia LDL goal <100    3. Severe obesity (BMI 35.0-39.9) with comorbidity    4. Chronic gout of multiple sites, unspecified cause    5. Essential hypertension         Chest pain  Has some typical features and risk factors include age and hyperlipidemia  Family history is unknown  He does not smoke  Nuclear stress test is negative for ischemia.  Reassurance provided to the patient    Hypertension   Blood pressure and heart rate are well-controlled    Hyperlipidemia  Lipid panel shows , HDL 36, triglyceride 134 and total cholesterol 231.  Started on atorvastatin.  Repeat lipid panel shows , HDL 36, triglyceride 143 and total cholesterol 161    Anxiety/depression  Currently on escitalopram    Gout  Continue allopurinol     Obesity  BMI is 33.  He weighs  240 pounds.  He has lost 215 pounds since last visit  We discussed a heart healthy diet and exercise  Lives a pretty sedentary lifestyle.  Also mentions that he is under a lot of stress at home  We discussed routine exercise, healthy diet and intermittent fasting

## 2024-12-10 ENCOUNTER — OFFICE VISIT (OUTPATIENT)
Dept: CARDIOLOGY | Facility: CLINIC | Age: 39
End: 2024-12-10
Payer: COMMERCIAL

## 2024-12-10 VITALS
HEIGHT: 71 IN | SYSTOLIC BLOOD PRESSURE: 122 MMHG | BODY MASS INDEX: 33.6 KG/M2 | OXYGEN SATURATION: 99 % | WEIGHT: 240 LBS | HEART RATE: 52 BPM | DIASTOLIC BLOOD PRESSURE: 71 MMHG

## 2024-12-10 DIAGNOSIS — R07.9 CHRONIC CHEST PAIN WITH LOW TO MODERATE RISK FOR CAD: Primary | ICD-10-CM

## 2024-12-10 DIAGNOSIS — E78.5 HYPERLIPIDEMIA LDL GOAL <100: ICD-10-CM

## 2024-12-10 DIAGNOSIS — M1A.09X0 CHRONIC GOUT OF MULTIPLE SITES, UNSPECIFIED CAUSE: Chronic | ICD-10-CM

## 2024-12-10 DIAGNOSIS — E66.01 SEVERE OBESITY (BMI 35.0-39.9) WITH COMORBIDITY: ICD-10-CM

## 2024-12-10 DIAGNOSIS — I10 ESSENTIAL HYPERTENSION: ICD-10-CM

## 2024-12-10 DIAGNOSIS — Z91.89 CHRONIC CHEST PAIN WITH LOW TO MODERATE RISK FOR CAD: Primary | ICD-10-CM

## 2024-12-10 DIAGNOSIS — G89.29 CHRONIC CHEST PAIN WITH LOW TO MODERATE RISK FOR CAD: Primary | ICD-10-CM

## 2024-12-10 RX ORDER — LAMOTRIGINE 25 MG/1
50 TABLET ORAL DAILY
COMMUNITY
Start: 2024-10-14

## 2025-01-13 ENCOUNTER — TELEMEDICINE (OUTPATIENT)
Dept: FAMILY MEDICINE CLINIC | Facility: TELEHEALTH | Age: 40
End: 2025-01-13
Payer: COMMERCIAL

## 2025-01-13 DIAGNOSIS — J20.9 ACUTE BRONCHITIS, UNSPECIFIED ORGANISM: ICD-10-CM

## 2025-01-13 DIAGNOSIS — J01.40 ACUTE PANSINUSITIS, RECURRENCE NOT SPECIFIED: Primary | ICD-10-CM

## 2025-01-13 PROCEDURE — 99213 OFFICE O/P EST LOW 20 MIN: CPT | Performed by: NURSE PRACTITIONER

## 2025-01-13 RX ORDER — PREDNISONE 10 MG/1
TABLET ORAL
Qty: 21 TABLET | Refills: 0 | Status: SHIPPED | OUTPATIENT
Start: 2025-01-13

## 2025-01-13 RX ORDER — GUAIFENESIN AND DEXTROMETHORPHAN HYDROBROMIDE 600; 30 MG/1; MG/1
1 TABLET, EXTENDED RELEASE ORAL EVERY 12 HOURS SCHEDULED
Qty: 20 TABLET | Refills: 0 | Status: SHIPPED | OUTPATIENT
Start: 2025-01-13 | End: 2025-01-23

## 2025-01-13 NOTE — PROGRESS NOTES
CHIEF COMPLAINT  Chief Complaint   Patient presents with    Cough    Sinusitis         HPI  Paul Vaughn is a 39 y.o. male  presents with complaint of URI symptoms that started a week ago. His brother had symptoms and was only sick a couple of days.   He is taking Mucinex Max Flu and this helps some. He is out of the medication.   He is having muscle soreness from coughing.     Review of Systems   Constitutional:  Positive for chills, diaphoresis and fatigue. Negative for fever.   HENT:  Positive for congestion, postnasal drip, rhinorrhea, sinus pressure, sneezing and sore throat. Negative for sinus pain.    Respiratory:  Positive for cough and chest tightness. Negative for shortness of breath and wheezing.    Cardiovascular:  Negative for chest pain.   Gastrointestinal:  Positive for diarrhea. Negative for nausea and vomiting.   Musculoskeletal:  Positive for myalgias.   Neurological:  Negative for headaches.       Past Medical History:   Diagnosis Date    Allergic     Anxiety     Arthritis     Depression     Gout     Insomnia     Pneumonia        Family History   Problem Relation Age of Onset    Mental illness Mother         Psychiatric care.    Depression Mother     COPD Mother     Emphysema Mother     Allergies Other     Gout Other     Alcohol abuse Other     Arthritis Other     Sleep apnea Neg Hx        Social History     Socioeconomic History    Marital status:    Tobacco Use    Smoking status: Former     Current packs/day: 0.00     Average packs/day: 2.0 packs/day for 20.0 years (40.0 ttl pk-yrs)     Types: Cigarettes     Quit date: 2016     Years since quittin.0     Passive exposure: Never    Smokeless tobacco: Never   Vaping Use    Vaping status: Never Used   Substance and Sexual Activity    Alcohol use: No    Drug use: Defer    Sexual activity: Defer         There were no vitals taken for this visit.    PHYSICAL EXAM  Physical Exam   Constitutional: He is oriented to person, place, and  time. He appears well-developed and well-nourished. He does not have a sickly appearance. He does not appear ill. No distress.   HENT:   Head: Normocephalic and atraumatic.   Eyes: EOM are normal.   Neck: Neck normal appearance.  Pulmonary/Chest: Effort normal.  No respiratory distress.  Neurological: He is alert and oriented to person, place, and time.   Skin: Skin is dry.   Psychiatric: He has a normal mood and affect.           Diagnoses and all orders for this visit:    1. Acute pansinusitis, recurrence not specified (Primary)    2. Acute bronchitis, unspecified organism    Other orders  -     amoxicillin-clavulanate (AUGMENTIN) 875-125 MG per tablet; Take 1 tablet by mouth 2 (Two) Times a Day for 7 days.  Dispense: 14 tablet; Refill: 0  -     predniSONE (DELTASONE) 10 MG (21) dose pack; Use as directed on package  Dispense: 21 tablet; Refill: 0  -     guaifenesin-dextromethorphan 600-30 mg (MUCINEX DM)  MG tablet sustained-release 12 hour; Take 1 tablet by mouth Every 12 (Twelve) Hours for 10 days.  Dispense: 20 tablet; Refill: 0        Mode of visit: Video   Myself and Paul Vaughn participated in this visit. The patient is located in [address] [place] [address] [place] [facility]. I am located in [place], [place]. Mychart and Twilio were utilized.   You have chosen to receive care through a telehealth visit.     Does the patient consent to use a video/audio connection for your medical care today? Yes       Note Disclaimer: At [facility], we believe that sharing information builds trust and better   relationships. You are receiving this note because you recently visited [facility]. It is possible you   will see health information before a provider has talked with you about it. This kind of information can   be easy to misunderstand. To help you fully understand what it means for your health, we urge you to   discuss this note with your provider.    Dana Joseph, TIM  01/13/2025  10:46  EST

## 2025-01-13 NOTE — PATIENT INSTRUCTIONS
Drink plenty of water  Over the counter pain relievers okay   If symptoms do not improve in 3-5 days follow up with your primary care provider or urgent care  If symptoms worsen follow up with urgent care or the emergency room      Sinus Infection, Adult  A sinus infection is soreness and swelling (inflammation) of your sinuses. Sinuses are hollow spaces in the bones around your face. They are located:  Around your eyes.  In the middle of your forehead.  Behind your nose.  In your cheekbones.  Your sinuses and nasal passages are lined with a fluid called mucus. Mucus drains out of your sinuses. Swelling can trap mucus in your sinuses. This lets germs (bacteria, virus, or fungus) grow, which leads to infection. Most of the time, this condition is caused by a virus.  What are the causes?  Allergies.  Asthma.  Germs.  Things that block your nose or sinuses.  Growths in the nose (nasal polyps).  Chemicals or irritants in the air.  A fungus. This is rare.  What increases the risk?  Having a weak body defense system (immune system).  Doing a lot of swimming or diving.  Using nasal sprays too much.  Smoking.  What are the signs or symptoms?  The main symptoms of this condition are pain and a feeling of pressure around the sinuses. Other symptoms include:  Stuffy nose (congestion). This may make it hard to breathe through your nose.  Runny nose (drainage).  Soreness, swelling, and warmth in the sinuses.  A cough that may get worse at night.  Being unable to smell and taste.  Mucus that collects in the throat or the back of the nose (postnasal drip). This may cause a sore throat or bad breath.  Being very tired (fatigued).  A fever.  How is this diagnosed?  Your symptoms.  Your medical history.  A physical exam.  Tests to find out if your condition is short-term (acute) or long-term (chronic). Your doctor may:  Check your nose for growths (polyps).  Check your sinuses using a tool that has a light on one end  (endoscope).  Check for allergies or germs.  Do imaging tests, such as an MRI or CT scan.  How is this treated?  Treatment for this condition depends on the cause and whether it is short-term or long-term.  If caused by a virus, your symptoms should go away on their own within 10 days. You may be given medicines to relieve symptoms. They include:  Medicines that shrink swollen tissue in the nose.  A spray that treats swelling of the nostrils.  Rinses that help get rid of thick mucus in your nose (nasal saline washes).  Medicines that treat allergies (antihistamines).  Over-the-counter pain relievers.  If caused by bacteria, your doctor may wait to see if you will get better without treatment. You may be given antibiotic medicine if you have:  A very bad infection.  A weak body defense system.  If caused by growths in the nose, surgery may be needed.  Follow these instructions at home:  Medicines  Take, use, or apply over-the-counter and prescription medicines only as told by your doctor. These may include nasal sprays.  If you were prescribed an antibiotic medicine, take it as told by your doctor. Do not stop taking it even if you start to feel better.  Hydrate and humidify    Drink enough water to keep your pee (urine) pale yellow.  Use a cool mist humidifier to keep the humidity level in your home above 50%.  Breathe in steam for 10-15 minutes, 3-4 times a day, or as told by your doctor. You can do this in the bathroom while a hot shower is running.  Try not to spend time in cool or dry air.  Rest  Rest as much as you can.  Sleep with your head raised (elevated).  Make sure you get enough sleep each night.  General instructions    Put a warm, moist washcloth on your face 3-4 times a day, or as often as told by your doctor.  Use nasal saline washes as often as told by your doctor.  Wash your hands often with soap and water. If you cannot use soap and water, use hand .  Do not smoke. Avoid being around  people who are smoking (secondhand smoke).  Keep all follow-up visits.  Contact a doctor if:  You have a fever.  Your symptoms get worse.  Your symptoms do not get better within 10 days.  Get help right away if:  You have a very bad headache.  You cannot stop vomiting.  You have very bad pain or swelling around your face or eyes.  You have trouble seeing.  You feel confused.  Your neck is stiff.  You have trouble breathing.  These symptoms may be an emergency. Get help right away. Call 911.  Do not wait to see if the symptoms will go away.  Do not drive yourself to the hospital.  Summary  A sinus infection is swelling of your sinuses. Sinuses are hollow spaces in the bones around your face.  This condition is caused by tissues in your nose that become inflamed or swollen. This traps germs. These can lead to infection.  If you were prescribed an antibiotic medicine, take it as told by your doctor. Do not stop taking it even if you start to feel better.  Keep all follow-up visits.  This information is not intended to replace advice given to you by your health care provider. Make sure you discuss any questions you have with your health care provider.  Document Revised: 11/22/2022 Document Reviewed: 11/22/2022  [employer] Patient Education © 2024 [employer]Acute Bronchitis, Adult    Acute bronchitis is when air tubes in the lungs (bronchi) suddenly get swollen. The condition can make it hard for you to breathe. In adults, acute bronchitis usually goes away within 2 weeks. A cough caused by bronchitis may last up to 3 weeks. Smoking, allergies, and asthma can make the condition worse.  What are the causes?  Germs that cause cold and flu (viruses). The most common cause of this condition is the virus that causes the common cold.  Bacteria.  Substances that bother (irritate) the lungs, including:  Smoke from cigarettes and other types of tobacco.  Dust and pollen.  Fumes from chemicals, gases, or burned fuel.  Indoor or  outdoor air pollution.  What increases the risk?  A weak body's defense system. This is also called the immune system.  Any condition that affects your lungs and breathing, such as asthma.  What are the signs or symptoms?  A cough.  Coughing up clear, yellow, or green mucus.  Making high-pitched whistling sounds when you breathe, most often when you breathe out (wheezing).  Runny or stuffy nose.  Having too much mucus in your lungs (chest congestion).  Shortness of breath.  Body aches.  A sore throat.  How is this treated?  Acute bronchitis may go away over time without treatment. Your doctor may tell you to:  Drink more fluids. This will help thin your mucus so it is easier to cough up.  Use a device that gets medicine into your lungs (inhaler).  Use a vaporizer or a humidifier. These are machines that add water to the air. This helps with coughing and poor breathing.  Take a medicine that thins mucus and helps clear it from your lungs.  Take a medicine that prevents or stops coughing.  It is not common to take an antibiotic medicine for this condition.  Follow these instructions at home:    Take over-the-counter and prescription medicines only as told by your doctor.  Use an inhaler, vaporizer, or humidifier as told by your doctor.  Take two teaspoons (10 mL) of honey at bedtime. This helps lessen your coughing at night.  Drink enough fluid to keep your pee (urine) pale yellow.  Do not smoke or use any products that contain nicotine or tobacco. If you need help quitting, ask your doctor.  Get a lot of rest.  Return to your normal activities when your doctor says that it is safe.  Keep all follow-up visits.  How is this prevented?    Wash your hands often with soap and water for at least 20 seconds. If you cannot use soap and water, use hand .  Avoid contact with people who have cold symptoms.  Try not to touch your mouth, nose, or eyes with your hands.  Avoid breathing in smoke or chemical fumes.  Make  sure to get the flu shot every year.  Contact a doctor if:  Your symptoms do not get better in 2 weeks.  You have trouble coughing up the mucus.  Your cough keeps you awake at night.  You have a fever.  Get help right away if:  You cough up blood.  You have chest pain.  You have very bad shortness of breath.  You faint or keep feeling like you are going to faint.  You have a very bad headache.  Your fever or chills get worse.  These symptoms may be an emergency. Get help right away. Call your local emergency services (911 in the [place]  Do not wait to see if the symptoms will go away.  Do not drive yourself to the hospital.  Summary  Acute bronchitis is when air tubes in the lungs (bronchi) suddenly get swollen. In adults, acute bronchitis usually goes away within 2 weeks.  Drink more fluids. This will help thin your mucus so it is easier to cough up.  Take over-the-counter and prescription medicines only as told by your doctor.  Contact a doctor if your symptoms do not improve after 2 weeks of treatment.  This information is not intended to replace advice given to you by your health care provider. Make sure you discuss any questions you have with your health care provider.  Document Revised: 04/20/2022 Document Reviewed: 04/20/2022  [employer] Patient Education © 2024 [employer]

## 2025-02-11 ENCOUNTER — OFFICE VISIT (OUTPATIENT)
Dept: FAMILY MEDICINE CLINIC | Facility: CLINIC | Age: 40
End: 2025-02-11
Payer: COMMERCIAL

## 2025-02-11 VITALS
WEIGHT: 244 LBS | TEMPERATURE: 98.4 F | RESPIRATION RATE: 18 BRPM | HEART RATE: 80 BPM | BODY MASS INDEX: 34.16 KG/M2 | OXYGEN SATURATION: 97 % | DIASTOLIC BLOOD PRESSURE: 78 MMHG | HEIGHT: 71 IN | SYSTOLIC BLOOD PRESSURE: 124 MMHG

## 2025-02-11 DIAGNOSIS — F33.1 MODERATE EPISODE OF RECURRENT MAJOR DEPRESSIVE DISORDER: Primary | ICD-10-CM

## 2025-02-11 PROCEDURE — 99213 OFFICE O/P EST LOW 20 MIN: CPT | Performed by: FAMILY MEDICINE

## 2025-02-11 RX ORDER — LAMOTRIGINE 25 MG/1
50 TABLET ORAL DAILY
Start: 2025-02-11

## 2025-02-11 NOTE — PROGRESS NOTES
"Chief Complaint  FMLA    Subjective        Paul Vaughn presents to [facility]  History of Present Illness  He needs FMLA forms completed for his employer to be allowed to take some time off when his mental health worsens.  Depression  Presents for follow-up visit. Symptoms include decreased concentration, excessive worry, muscle tension, nervousness/anxiety and psychomotor agitation. Patient is not experiencing: suicidal ideas, suicidal planning, thoughts of death, chest pain and feeling of choking.Symptoms occur most days.  The severity of symptoms is moderate.  The quality of sleep is fair. His past medical history is significant for depression. Past treatments include SSRI, individual therapy, counseling (CBT), lifestyle changes and medications. The treatment provides moderate relief.   Anxiety  Presents for follow-up visit.  Symptoms include decreased concentration, excessive worry, muscle tension and nervous/anxious behavior.  Patient reports no chest pain, feeling of choking or suicidal ideas. Symptoms occur most days. The severity of symptoms is moderate. The quality of sleep is fair. His past medical history is significant for depression.       Objective   Vital Signs:  /78 (BP Location: Left arm, Patient Position: Sitting, Cuff Size: Large Adult)   Pulse 80   Temp 98.4 °F (36.9 °C) (Temporal)   Resp 18   Ht 180.3 cm (71\")   Wt 111 kg (244 lb)   SpO2 97%   BMI 34.03 kg/m²   Estimated body mass index is 34.03 kg/m² as calculated from the following:    Height as of this encounter: 180.3 cm (71\").    Weight as of this encounter: 111 kg (244 lb).            Physical Exam  Vitals and nursing note reviewed.   Constitutional:       General: He is not in acute distress.     Appearance: He is well-developed.   HENT:      Head: Normocephalic.   Eyes:      General: Lids are normal.      Conjunctiva/sclera: Conjunctivae normal.   Neck:      Thyroid: No thyroid mass " or thyromegaly.      Trachea: Trachea normal.   Cardiovascular:      Rate and Rhythm: Normal rate and regular rhythm.      Heart sounds: Normal heart sounds.   Pulmonary:      Effort: Pulmonary effort is normal.      Breath sounds: Normal breath sounds.   Musculoskeletal:      Cervical back: Normal range of motion.   Lymphadenopathy:      Cervical: No cervical adenopathy.   Skin:     General: Skin is warm and dry.   Neurological:      Mental Status: He is alert and oriented to person, place, and time.   Psychiatric:         Attention and Perception: He is attentive.         Mood and Affect: Mood normal.         Speech: Speech normal.         Behavior: Behavior normal.        Result Review :  The following data was reviewed by: Jamia Aiken MD on 02/11/2025:  Common labs          4/23/2024    13:44 7/23/2024    15:21   Common Labs   Glucose 87  85    BUN 9  11    Creatinine 0.95  1.02    Sodium 142  141    Potassium 4.8  4.2    Chloride 106  104    Calcium 9.3  9.1    Albumin 4.1  4.0    Total Bilirubin 0.2  0.4    Alkaline Phosphatase 77  91    AST (SGOT) 22  20    ALT (SGPT) 30  24    WBC 6.57     Hemoglobin 14.9     Hematocrit 44.4     Platelets 201     Total Cholesterol 231  161    Triglycerides 134  143    HDL Cholesterol 36  36    LDL Cholesterol  170  100    Uric Acid 7.7                 Assessment and Plan   Diagnoses and all orders for this visit:    1. Moderate episode of recurrent major depressive disorder (Primary)  Assessment & Plan:  Patient's depression is a recurrent episode that is moderate without psychosis. Depression is in partial remission and improving with treatment.    Plan:   Continue current medication therapy     Followup in 6 months.       Other orders  -     lamoTRIgine (LaMICtal) 25 MG tablet; Take 2 tablets by mouth Daily. Please see attached for detailed directions             Follow Up   No follow-ups on file.  Patient was given instructions and counseling regarding his condition  or for health maintenance advice. Please see specific information pulled into the AVS if appropriate.

## 2025-02-11 NOTE — ASSESSMENT & PLAN NOTE
Patient's depression is a recurrent episode that is moderate without psychosis. Depression is in partial remission and improving with treatment.    Plan:   Continue current medication therapy     Followup in 6 months.

## 2025-03-24 ENCOUNTER — OFFICE VISIT (OUTPATIENT)
Dept: FAMILY MEDICINE CLINIC | Facility: CLINIC | Age: 40
End: 2025-03-24
Payer: COMMERCIAL

## 2025-03-24 ENCOUNTER — LAB (OUTPATIENT)
Dept: FAMILY MEDICINE CLINIC | Facility: CLINIC | Age: 40
End: 2025-03-24
Payer: COMMERCIAL

## 2025-03-24 VITALS
HEART RATE: 63 BPM | HEIGHT: 71 IN | OXYGEN SATURATION: 96 % | BODY MASS INDEX: 34.72 KG/M2 | DIASTOLIC BLOOD PRESSURE: 73 MMHG | WEIGHT: 248 LBS | SYSTOLIC BLOOD PRESSURE: 114 MMHG | TEMPERATURE: 98.6 F | RESPIRATION RATE: 18 BRPM

## 2025-03-24 DIAGNOSIS — Z23 NEED FOR TDAP VACCINATION: ICD-10-CM

## 2025-03-24 DIAGNOSIS — R20.0 NUMBNESS OF FINGER: Primary | ICD-10-CM

## 2025-03-24 LAB
BASOPHILS # BLD AUTO: 0.03 10*3/MM3 (ref 0–0.2)
BASOPHILS NFR BLD AUTO: 0.4 % (ref 0–1.5)
DEPRECATED RDW RBC AUTO: 41.7 FL (ref 37–54)
EOSINOPHIL # BLD AUTO: 0.22 10*3/MM3 (ref 0–0.4)
EOSINOPHIL NFR BLD AUTO: 2.9 % (ref 0.3–6.2)
ERYTHROCYTE [DISTWIDTH] IN BLOOD BY AUTOMATED COUNT: 12.4 % (ref 12.3–15.4)
HCT VFR BLD AUTO: 44.1 % (ref 37.5–51)
HGB BLD-MCNC: 15.2 G/DL (ref 13–17.7)
IMM GRANULOCYTES # BLD AUTO: 0.03 10*3/MM3 (ref 0–0.05)
IMM GRANULOCYTES NFR BLD AUTO: 0.4 % (ref 0–0.5)
LYMPHOCYTES # BLD AUTO: 2.37 10*3/MM3 (ref 0.7–3.1)
LYMPHOCYTES NFR BLD AUTO: 31.1 % (ref 19.6–45.3)
MCH RBC QN AUTO: 31.6 PG (ref 26.6–33)
MCHC RBC AUTO-ENTMCNC: 34.5 G/DL (ref 31.5–35.7)
MCV RBC AUTO: 91.7 FL (ref 79–97)
MONOCYTES # BLD AUTO: 0.41 10*3/MM3 (ref 0.1–0.9)
MONOCYTES NFR BLD AUTO: 5.4 % (ref 5–12)
NEUTROPHILS NFR BLD AUTO: 4.56 10*3/MM3 (ref 1.7–7)
NEUTROPHILS NFR BLD AUTO: 59.8 % (ref 42.7–76)
NRBC BLD AUTO-RTO: 0 /100 WBC (ref 0–0.2)
PLATELET # BLD AUTO: 195 10*3/MM3 (ref 140–450)
PMV BLD AUTO: 10.4 FL (ref 6–12)
RBC # BLD AUTO: 4.81 10*6/MM3 (ref 4.14–5.8)
WBC NRBC COR # BLD AUTO: 7.62 10*3/MM3 (ref 3.4–10.8)

## 2025-03-24 PROCEDURE — 84443 ASSAY THYROID STIM HORMONE: CPT | Performed by: FAMILY MEDICINE

## 2025-03-24 PROCEDURE — 90715 TDAP VACCINE 7 YRS/> IM: CPT | Performed by: FAMILY MEDICINE

## 2025-03-24 PROCEDURE — 80048 BASIC METABOLIC PNL TOTAL CA: CPT | Performed by: FAMILY MEDICINE

## 2025-03-24 PROCEDURE — 90471 IMMUNIZATION ADMIN: CPT | Performed by: FAMILY MEDICINE

## 2025-03-24 PROCEDURE — 36415 COLL VENOUS BLD VENIPUNCTURE: CPT | Performed by: FAMILY MEDICINE

## 2025-03-24 PROCEDURE — 85025 COMPLETE CBC W/AUTO DIFF WBC: CPT | Performed by: FAMILY MEDICINE

## 2025-03-24 PROCEDURE — 82607 VITAMIN B-12: CPT | Performed by: FAMILY MEDICINE

## 2025-03-24 PROCEDURE — 99213 OFFICE O/P EST LOW 20 MIN: CPT | Performed by: FAMILY MEDICINE

## 2025-03-24 PROCEDURE — 83735 ASSAY OF MAGNESIUM: CPT | Performed by: FAMILY MEDICINE

## 2025-03-24 NOTE — PROGRESS NOTES
Injection  Injection performed in LEFT DELTOID by Gray Luna MA. Patient tolerated the procedure well without complications.    Patient Supplied: NO    BOOSTRIX   NDC: 48818-052-06  LOT:   EXP: 4/19/2027 03/24/25   Gray Luna MA

## 2025-03-24 NOTE — PROGRESS NOTES
"Chief Complaint  Numbness (Left Ring Finger/Onset ~ 3 weeks)    Subjective        Paul Vaughn presents to [facility]  Hand Pain   The incident occurred more than 1 week ago. There was no injury mechanism. The pain is present in the left fingers (only tip of left 4th finger). Quality: numbness. The patient is experiencing no pain. The pain has been Constant since the incident. Associated symptoms include numbness. Nothing aggravates the symptoms. He has tried nothing for the symptoms.       Objective   Vital Signs:  /73 (BP Location: Left arm, Patient Position: Sitting, Cuff Size: Large Adult)   Pulse 63   Temp 98.6 °F (37 °C) (Temporal)   Resp 18   Ht 180.3 cm (71\")   Wt 112 kg (248 lb)   SpO2 96%   BMI 34.59 kg/m²   Estimated body mass index is 34.59 kg/m² as calculated from the following:    Height as of this encounter: 180.3 cm (71\").    Weight as of this encounter: 112 kg (248 lb).            Physical Exam  Vitals and nursing note reviewed.   Constitutional:       General: He is not in acute distress.     Appearance: He is well-developed.   HENT:      Head: Normocephalic.   Eyes:      General: Lids are normal.      Conjunctiva/sclera: Conjunctivae normal.   Neck:      Thyroid: No thyroid mass or thyromegaly.      Trachea: Trachea normal.   Cardiovascular:      Rate and Rhythm: Normal rate and regular rhythm.      Heart sounds: Normal heart sounds.   Pulmonary:      Effort: Pulmonary effort is normal.      Breath sounds: Normal breath sounds.   Musculoskeletal:         General: No swelling, tenderness, deformity or signs of injury.      Cervical back: Normal range of motion.   Lymphadenopathy:      Cervical: No cervical adenopathy.   Skin:     General: Skin is warm and dry.   Neurological:      Mental Status: He is alert and oriented to person, place, and time.      Comments: Numbness of left 4th finger tip   Psychiatric:         Attention and Perception: He " is attentive.         Mood and Affect: Mood normal.         Speech: Speech normal.         Behavior: Behavior normal.        Result Review :  The following data was reviewed by: Jamia Aiken MD on 03/24/2025:  Common labs          4/23/2024    13:44 7/23/2024    15:21   Common Labs   Glucose 87  85    BUN 9  11    Creatinine 0.95  1.02    Sodium 142  141    Potassium 4.8  4.2    Chloride 106  104    Calcium 9.3  9.1    Albumin 4.1  4.0    Total Bilirubin 0.2  0.4    Alkaline Phosphatase 77  91    AST (SGOT) 22  20    ALT (SGPT) 30  24    WBC 6.57     Hemoglobin 14.9     Hematocrit 44.4     Platelets 201     Total Cholesterol 231  161    Triglycerides 134  143    HDL Cholesterol 36  36    LDL Cholesterol  170  100    Uric Acid 7.7                 Assessment and Plan   Diagnoses and all orders for this visit:    1. Numbness of finger (Primary)  -     Basic Metabolic Panel  -     TSH  -     Magnesium  -     Vitamin B12  -     CBC & Differential    2. Need for Tdap vaccination  -     Tdap Vaccine Greater Than or Equal To 8yo IM             Follow Up   No follow-ups on file.  Patient was given instructions and counseling regarding his condition or for health maintenance advice. Please see specific information pulled into the AVS if appropriate.

## 2025-03-25 LAB
ANION GAP SERPL CALCULATED.3IONS-SCNC: 8.1 MMOL/L (ref 5–15)
BUN SERPL-MCNC: 10 MG/DL (ref 6–20)
BUN/CREAT SERPL: 9.8 (ref 7–25)
CALCIUM SPEC-SCNC: 9.2 MG/DL (ref 8.6–10.5)
CHLORIDE SERPL-SCNC: 102 MMOL/L (ref 98–107)
CO2 SERPL-SCNC: 27.9 MMOL/L (ref 22–29)
CREAT SERPL-MCNC: 1.02 MG/DL (ref 0.76–1.27)
EGFRCR SERPLBLD CKD-EPI 2021: 94.1 ML/MIN/1.73
GLUCOSE SERPL-MCNC: 104 MG/DL (ref 65–99)
MAGNESIUM SERPL-MCNC: 2.3 MG/DL (ref 1.6–2.6)
POTASSIUM SERPL-SCNC: 3.9 MMOL/L (ref 3.5–5.2)
SODIUM SERPL-SCNC: 138 MMOL/L (ref 136–145)
TSH SERPL DL<=0.05 MIU/L-ACNC: 1.64 UIU/ML (ref 0.27–4.2)
VIT B12 BLD-MCNC: 688 PG/ML (ref 211–946)

## 2025-04-01 ENCOUNTER — TELEPHONE (OUTPATIENT)
Dept: FAMILY MEDICINE CLINIC | Facility: CLINIC | Age: 40
End: 2025-04-01
Payer: COMMERCIAL

## 2025-04-01 DIAGNOSIS — R20.0 NUMBNESS OF FINGER: Primary | ICD-10-CM

## 2025-04-01 NOTE — TELEPHONE ENCOUNTER
PT RETURNED CALL AND UNDERSTOOD HIS LABS AND ALSO SAID THAT HE WOULD LIKE TO HAVE THE NERVE CONDUCTION DONE.

## 2025-05-12 ENCOUNTER — TELEPHONE (OUTPATIENT)
Dept: CARDIOLOGY | Facility: CLINIC | Age: 40
End: 2025-05-12
Payer: COMMERCIAL

## 2025-05-12 RX ORDER — ATORVASTATIN CALCIUM 40 MG/1
40 TABLET, FILM COATED ORAL DAILY
Qty: 90 TABLET | Refills: 3 | Status: SHIPPED | OUTPATIENT
Start: 2025-05-12

## 2025-05-12 NOTE — TELEPHONE ENCOUNTER
Rx Refill Note  Requested Prescriptions     Pending Prescriptions Disp Refills    atorvastatin (LIPITOR) 40 MG tablet 90 tablet 3     Sig: Take 1 tablet by mouth Daily.      Last office visit with prescribing clinician: 12/10/2024   Next office visit with prescribing clinician: 12/12/2025                         Key Mcintyre MA  05/12/25, 10:50 EDT

## 2025-06-13 ENCOUNTER — HOSPITAL ENCOUNTER (OUTPATIENT)
Dept: NEUROLOGY | Facility: HOSPITAL | Age: 40
Discharge: HOME | End: 2025-06-13
Payer: COMMERCIAL

## 2025-06-13 DIAGNOSIS — R20.0 NUMBNESS OF FINGER: ICD-10-CM

## 2025-06-13 PROCEDURE — 95886 MUSC TEST DONE W/N TEST COMP: CPT

## 2025-06-13 PROCEDURE — 95908 NRV CNDJ TST 3-4 STUDIES: CPT

## 2025-07-25 ENCOUNTER — OFFICE VISIT (OUTPATIENT)
Dept: FAMILY MEDICINE CLINIC | Facility: CLINIC | Age: 40
End: 2025-07-25
Payer: COMMERCIAL

## 2025-07-25 VITALS
HEIGHT: 71 IN | TEMPERATURE: 98.4 F | DIASTOLIC BLOOD PRESSURE: 81 MMHG | SYSTOLIC BLOOD PRESSURE: 123 MMHG | BODY MASS INDEX: 35.78 KG/M2 | OXYGEN SATURATION: 95 % | WEIGHT: 255.6 LBS | HEART RATE: 74 BPM

## 2025-07-25 DIAGNOSIS — R22.31 MASS OF RIGHT ELBOW: Primary | ICD-10-CM

## 2025-07-25 PROCEDURE — 99213 OFFICE O/P EST LOW 20 MIN: CPT | Performed by: FAMILY MEDICINE

## 2025-07-25 NOTE — PROGRESS NOTES
"Chief Complaint  Primary Care Follow-Up and Mass (Right Elbow)    Subjective        Paul Vaughn presents to [facility]  Primary Care Follow-Up  Elbow Injury  Chronicity:  New  Onset:  1 to 4 weeks  Frequency:  Constantly  Progression since onset:  Unchanged  Aggravating factors:  Bending and twisting  Treatments tried:  Rest      Objective   Vital Signs:  /81 (BP Location: Left arm, Patient Position: Sitting, Cuff Size: Adult)   Pulse 74   Temp 98.4 °F (36.9 °C) (Temporal)   Ht 180.3 cm (71\")   Wt 116 kg (255 lb 9.6 oz)   SpO2 95%   BMI 35.65 kg/m²   Estimated body mass index is 35.65 kg/m² as calculated from the following:    Height as of this encounter: 180.3 cm (71\").    Weight as of this encounter: 116 kg (255 lb 9.6 oz).    Class 2 Severe Obesity (BMI >=35 and <=39.9). Obesity-related health conditions include the following: none. Obesity is unchanged. BMI is is above average; BMI management plan is completed. We discussed portion control and increasing exercise.      Physical Exam  Vitals and nursing note reviewed.   Constitutional:       General: He is not in acute distress.     Appearance: He is well-developed.   HENT:      Head: Normocephalic.   Eyes:      General: Lids are normal.      Conjunctiva/sclera: Conjunctivae normal.   Neck:      Thyroid: No thyroid mass or thyromegaly.      Trachea: Trachea normal.   Cardiovascular:      Rate and Rhythm: Normal rate and regular rhythm.      Heart sounds: Normal heart sounds.   Pulmonary:      Effort: Pulmonary effort is normal.      Breath sounds: Normal breath sounds.   Musculoskeletal:      Right elbow: Swelling and deformity present.      Cervical back: Normal range of motion.   Lymphadenopathy:      Cervical: No cervical adenopathy.   Skin:     General: Skin is warm and dry.   Neurological:      Mental Status: He is alert and oriented to person, place, and time.   Psychiatric:         Attention and " Perception: He is attentive.         Mood and Affect: Mood normal.         Speech: Speech normal.         Behavior: Behavior normal.        Result Review :  The following data was reviewed by: Jamia Aiken MD on 07/25/2025:  Common labs          3/24/2025    15:52   Common Labs   Glucose 104    BUN 10    Creatinine 1.02    Sodium 138    Potassium 3.9    Chloride 102    Calcium 9.2    WBC 7.62    Hemoglobin 15.2    Hematocrit 44.1    Platelets 195                Assessment and Plan   Diagnoses and all orders for this visit:    1. Mass of right elbow (Primary)  Assessment & Plan:  Apply a compressive ACE bandage. Rest and elevate the affected painful area.  Apply cold compresses intermittently as needed.  As pain recedes, begin normal activities slowly as tolerated.  Call if symptoms persist.  Tylenol 1-2 tabs po q4h prn      Orders:  -     Ambulatory Referral to Hand Surgery             Follow Up   No follow-ups on file.  Patient was given instructions and counseling regarding his condition or for health maintenance advice. Please see specific information pulled into the AVS if appropriate.

## 2025-07-25 NOTE — ASSESSMENT & PLAN NOTE
Apply a compressive ACE bandage. Rest and elevate the affected painful area.  Apply cold compresses intermittently as needed.  As pain recedes, begin normal activities slowly as tolerated.  Call if symptoms persist.  Tylenol 1-2 tabs po q4h prn

## 2025-08-07 ENCOUNTER — TELEPHONE (OUTPATIENT)
Dept: FAMILY MEDICINE CLINIC | Facility: CLINIC | Age: 40
End: 2025-08-07
Payer: COMMERCIAL

## 2025-08-15 RX ORDER — ALLOPURINOL 300 MG/1
300 TABLET ORAL DAILY
Qty: 90 TABLET | Refills: 3 | Status: SHIPPED | OUTPATIENT
Start: 2025-08-15